# Patient Record
Sex: FEMALE | Race: WHITE | HISPANIC OR LATINO | URBAN - METROPOLITAN AREA
[De-identification: names, ages, dates, MRNs, and addresses within clinical notes are randomized per-mention and may not be internally consistent; named-entity substitution may affect disease eponyms.]

---

## 2017-09-23 ENCOUNTER — EMERGENCY (EMERGENCY)
Facility: HOSPITAL | Age: 57
LOS: 1 days | Discharge: HOME | End: 2017-09-23
Attending: EMERGENCY MEDICINE
Payer: COMMERCIAL

## 2017-09-23 VITALS
HEART RATE: 78 BPM | OXYGEN SATURATION: 97 % | RESPIRATION RATE: 16 BRPM | SYSTOLIC BLOOD PRESSURE: 102 MMHG | TEMPERATURE: 99 F | WEIGHT: 162.04 LBS | HEIGHT: 63 IN | DIASTOLIC BLOOD PRESSURE: 68 MMHG

## 2017-09-23 PROCEDURE — 99284 EMERGENCY DEPT VISIT MOD MDM: CPT

## 2017-09-23 PROCEDURE — 99283 EMERGENCY DEPT VISIT LOW MDM: CPT

## 2017-09-23 PROCEDURE — T1013: CPT

## 2017-09-23 RX ORDER — VALACYCLOVIR 500 MG/1
1 TABLET, FILM COATED ORAL
Qty: 21 | Refills: 0 | OUTPATIENT
Start: 2017-09-23 | End: 2017-09-30

## 2017-09-23 NOTE — ED STATDOCS - OBJECTIVE STATEMENT
56 y/o F presents to ED c/o painful rash to back x 2 days. Denies N/V/D, fever, chills, SOB, CP, difficulty breathing, HA, numbness, tingling and abd pain.

## 2017-10-08 ENCOUNTER — EMERGENCY (EMERGENCY)
Facility: HOSPITAL | Age: 57
LOS: 1 days | Discharge: HOME | End: 2017-10-08
Attending: EMERGENCY MEDICINE
Payer: COMMERCIAL

## 2017-10-08 VITALS
DIASTOLIC BLOOD PRESSURE: 73 MMHG | SYSTOLIC BLOOD PRESSURE: 132 MMHG | HEIGHT: 64 IN | OXYGEN SATURATION: 98 % | RESPIRATION RATE: 17 BRPM | HEART RATE: 102 BPM | TEMPERATURE: 102 F | WEIGHT: 162.92 LBS

## 2017-10-08 LAB
APPEARANCE UR: CLEAR — SIGNIFICANT CHANGE UP
BILIRUB UR-MCNC: NEGATIVE — SIGNIFICANT CHANGE UP
COLOR SPEC: YELLOW — SIGNIFICANT CHANGE UP
DIFF PNL FLD: ABNORMAL
GLUCOSE UR QL: NEGATIVE MG/DL — SIGNIFICANT CHANGE UP
KETONES UR-MCNC: NEGATIVE — SIGNIFICANT CHANGE UP
LEUKOCYTE ESTERASE UR-ACNC: ABNORMAL
NITRITE UR-MCNC: NEGATIVE — SIGNIFICANT CHANGE UP
PH UR: 7 — SIGNIFICANT CHANGE UP (ref 5–8)
PROT UR-MCNC: 15 MG/DL
SP GR SPEC: 1.01 — SIGNIFICANT CHANGE UP (ref 1.01–1.02)
UROBILINOGEN FLD QL: 4 MG/DL

## 2017-10-08 PROCEDURE — 81001 URINALYSIS AUTO W/SCOPE: CPT

## 2017-10-08 PROCEDURE — 99284 EMERGENCY DEPT VISIT MOD MDM: CPT

## 2017-10-08 PROCEDURE — 99284 EMERGENCY DEPT VISIT MOD MDM: CPT | Mod: 25

## 2017-10-08 PROCEDURE — 71020: CPT | Mod: 26

## 2017-10-08 PROCEDURE — 71046 X-RAY EXAM CHEST 2 VIEWS: CPT

## 2017-10-08 PROCEDURE — 96374 THER/PROPH/DIAG INJ IV PUSH: CPT

## 2017-10-08 RX ORDER — SODIUM CHLORIDE 9 MG/ML
2000 INJECTION INTRAMUSCULAR; INTRAVENOUS; SUBCUTANEOUS ONCE
Qty: 0 | Refills: 0 | Status: COMPLETED | OUTPATIENT
Start: 2017-10-08 | End: 2017-10-08

## 2017-10-08 RX ORDER — ACETAMINOPHEN 500 MG
650 TABLET ORAL ONCE
Qty: 0 | Refills: 0 | Status: COMPLETED | OUTPATIENT
Start: 2017-10-08 | End: 2017-10-08

## 2017-10-08 RX ORDER — KETOROLAC TROMETHAMINE 30 MG/ML
30 SYRINGE (ML) INJECTION ONCE
Qty: 0 | Refills: 0 | Status: DISCONTINUED | OUTPATIENT
Start: 2017-10-08 | End: 2017-10-08

## 2017-10-08 RX ADMIN — Medication 650 MILLIGRAM(S): at 22:27

## 2017-10-08 RX ADMIN — SODIUM CHLORIDE 2000 MILLILITER(S): 9 INJECTION INTRAMUSCULAR; INTRAVENOUS; SUBCUTANEOUS at 22:27

## 2017-10-08 RX ADMIN — Medication 30 MILLIGRAM(S): at 22:28

## 2017-10-08 NOTE — ED ADULT NURSE NOTE - OBJECTIVE STATEMENT
patient awake and alert with family at bedside patient states that for three days she has been having pain to her neck and trouble sleeping - states that she has been taking motrin with good results, but then again in the am the pain returns. patient with clear breath sounds - denies difficulty breathing - denies nausea or vomitting at this time.

## 2017-10-08 NOTE — ED ADULT NURSE NOTE - NS ED NURSE DC INFO COMPLEXITY
Simple: Patient demonstrates quick and easy understanding/Patient asked questions/Returned Demonstration

## 2017-10-08 NOTE — ED PROVIDER NOTE - OBJECTIVE STATEMENT
56 y/o F presents to ED c/o neck pain x 3 days. Associated Sx fever, dry cough, and weakness. Non-smoker and non-drinker. NKDA. Took 800mg ibuprofen to relief. Pt states she was in ED two weeks ago and was Dx with shingles, went to dermatologist and was told it was poison ivy. Denies N/V/D, chills, SOB, CP, difficulty breathing, HA, numbness, tingling, recent travel and abd pain.  PCP: Dr. Clint Cm

## 2017-10-08 NOTE — ED PROVIDER NOTE - MEDICAL DECISION MAKING DETAILS
56 y/o F presents to ED c/o neck pain and fever will get chest x-ray, UA, IV fluids, pruritics and re-evaluate. Likely viral syndrome.

## 2017-10-08 NOTE — ED ADULT TRIAGE NOTE - CHIEF COMPLAINT QUOTE
Pt axox3 c/o right sided neck pain and fever x1 day. Pt oral temp 102.0,  in ER. Pt denies chest pain or sob.

## 2017-10-09 ENCOUNTER — TRANSCRIPTION ENCOUNTER (OUTPATIENT)
Age: 57
End: 2017-10-09

## 2017-10-09 VITALS
DIASTOLIC BLOOD PRESSURE: 56 MMHG | OXYGEN SATURATION: 96 % | TEMPERATURE: 99 F | SYSTOLIC BLOOD PRESSURE: 103 MMHG | RESPIRATION RATE: 16 BRPM | HEART RATE: 85 BPM

## 2017-10-09 NOTE — ED ADULT NURSE REASSESSMENT NOTE - NS ED NURSE REASSESS COMMENT FT1
patient states that she is feeling better, no signs of pain at this time. no nausea or vomitting noted.

## 2017-10-18 ENCOUNTER — INPATIENT (INPATIENT)
Facility: HOSPITAL | Age: 57
LOS: 4 days | Discharge: HOME | DRG: 872 | End: 2017-10-23
Attending: HOSPITALIST | Admitting: HOSPITALIST
Payer: COMMERCIAL

## 2017-10-18 VITALS
WEIGHT: 164.91 LBS | SYSTOLIC BLOOD PRESSURE: 93 MMHG | OXYGEN SATURATION: 95 % | RESPIRATION RATE: 20 BRPM | DIASTOLIC BLOOD PRESSURE: 58 MMHG | HEIGHT: 61 IN | HEART RATE: 127 BPM | TEMPERATURE: 101 F

## 2017-10-18 DIAGNOSIS — A41.9 SEPSIS, UNSPECIFIED ORGANISM: ICD-10-CM

## 2017-10-18 DIAGNOSIS — O34.219 MATERNAL CARE FOR UNSPECIFIED TYPE SCAR FROM PREVIOUS CESAREAN DELIVERY: Chronic | ICD-10-CM

## 2017-10-18 LAB
ALBUMIN SERPL ELPH-MCNC: 3.5 G/DL — SIGNIFICANT CHANGE UP (ref 3.3–5.2)
ALP SERPL-CCNC: 177 U/L — HIGH (ref 40–120)
ALT FLD-CCNC: 48 U/L — HIGH
ANION GAP SERPL CALC-SCNC: 12 MMOL/L — SIGNIFICANT CHANGE UP (ref 5–17)
APPEARANCE UR: CLEAR — SIGNIFICANT CHANGE UP
APTT BLD: 28.2 SEC — SIGNIFICANT CHANGE UP (ref 27.5–37.4)
AST SERPL-CCNC: 43 U/L — HIGH
BACTERIA # UR AUTO: ABNORMAL
BILIRUB SERPL-MCNC: 0.4 MG/DL — SIGNIFICANT CHANGE UP (ref 0.4–2)
BILIRUB UR-MCNC: NEGATIVE — SIGNIFICANT CHANGE UP
BUN SERPL-MCNC: 14 MG/DL — SIGNIFICANT CHANGE UP (ref 8–20)
CALCIUM SERPL-MCNC: 8.4 MG/DL — LOW (ref 8.6–10.2)
CHLORIDE SERPL-SCNC: 95 MMOL/L — LOW (ref 98–107)
CO2 SERPL-SCNC: 24 MMOL/L — SIGNIFICANT CHANGE UP (ref 22–29)
COLOR SPEC: YELLOW — SIGNIFICANT CHANGE UP
CREAT SERPL-MCNC: 0.89 MG/DL — SIGNIFICANT CHANGE UP (ref 0.5–1.3)
DIFF PNL FLD: ABNORMAL
EPI CELLS # UR: SIGNIFICANT CHANGE UP
GLUCOSE SERPL-MCNC: 155 MG/DL — HIGH (ref 70–115)
GLUCOSE UR QL: NEGATIVE MG/DL — SIGNIFICANT CHANGE UP
HCT VFR BLD CALC: 40.9 % — SIGNIFICANT CHANGE UP (ref 37–47)
HGB BLD-MCNC: 13 G/DL — SIGNIFICANT CHANGE UP (ref 12–16)
INR BLD: 1.15 RATIO — SIGNIFICANT CHANGE UP (ref 0.88–1.16)
KETONES UR-MCNC: ABNORMAL
LACTATE BLDV-MCNC: 2 MMOL/L — SIGNIFICANT CHANGE UP (ref 0.5–2)
LEUKOCYTE ESTERASE UR-ACNC: ABNORMAL
LYMPHOCYTES # BLD AUTO: 45 % — SIGNIFICANT CHANGE UP (ref 20–55)
MACROCYTES BLD QL: SLIGHT — SIGNIFICANT CHANGE UP
MANUAL DIF COMMENT BLD-IMP: SIGNIFICANT CHANGE UP
MCHC RBC-ENTMCNC: 29.2 PG — SIGNIFICANT CHANGE UP (ref 27–31)
MCHC RBC-ENTMCNC: 31.8 G/DL — LOW (ref 32–36)
MCV RBC AUTO: 91.9 FL — SIGNIFICANT CHANGE UP (ref 81–99)
MICROCYTES BLD QL: SLIGHT — SIGNIFICANT CHANGE UP
MONOCYTES NFR BLD AUTO: 9 % — SIGNIFICANT CHANGE UP (ref 3–10)
NEUTROPHILS NFR BLD AUTO: 42 % — SIGNIFICANT CHANGE UP (ref 37–73)
NEUTS BAND # BLD: 1 % — SIGNIFICANT CHANGE UP (ref 0–8)
NITRITE UR-MCNC: NEGATIVE — SIGNIFICANT CHANGE UP
PH UR: 6 — SIGNIFICANT CHANGE UP (ref 5–8)
PLAT MORPH BLD: NORMAL — SIGNIFICANT CHANGE UP
PLATELET # BLD AUTO: 252 K/UL — SIGNIFICANT CHANGE UP (ref 150–400)
POLYCHROMASIA BLD QL SMEAR: SLIGHT — SIGNIFICANT CHANGE UP
POTASSIUM SERPL-MCNC: 4.2 MMOL/L — SIGNIFICANT CHANGE UP (ref 3.5–5.3)
POTASSIUM SERPL-SCNC: 4.2 MMOL/L — SIGNIFICANT CHANGE UP (ref 3.5–5.3)
PROT SERPL-MCNC: 7.4 G/DL — SIGNIFICANT CHANGE UP (ref 6.6–8.7)
PROT UR-MCNC: 30 MG/DL
PROTHROM AB SERPL-ACNC: 12.7 SEC — SIGNIFICANT CHANGE UP (ref 9.8–12.7)
RBC # BLD: 4.45 M/UL — SIGNIFICANT CHANGE UP (ref 4.4–5.2)
RBC # FLD: 14.6 % — SIGNIFICANT CHANGE UP (ref 11–15.6)
RBC BLD AUTO: ABNORMAL
RBC CASTS # UR COMP ASSIST: SIGNIFICANT CHANGE UP /HPF (ref 0–4)
SODIUM SERPL-SCNC: 131 MMOL/L — LOW (ref 135–145)
SP GR SPEC: 1.01 — SIGNIFICANT CHANGE UP (ref 1.01–1.02)
UROBILINOGEN FLD QL: 1 MG/DL
VARIANT LYMPHS # BLD: 3 % — SIGNIFICANT CHANGE UP (ref 0–6)
WBC # BLD: 8.4 K/UL — SIGNIFICANT CHANGE UP (ref 4.8–10.8)
WBC # FLD AUTO: 8.4 K/UL — SIGNIFICANT CHANGE UP (ref 4.8–10.8)
WBC UR QL: SIGNIFICANT CHANGE UP

## 2017-10-18 PROCEDURE — 71250 CT THORAX DX C-: CPT | Mod: 26

## 2017-10-18 PROCEDURE — 70486 CT MAXILLOFACIAL W/O DYE: CPT | Mod: 26

## 2017-10-18 PROCEDURE — 74176 CT ABD & PELVIS W/O CONTRAST: CPT | Mod: 26

## 2017-10-18 PROCEDURE — 99285 EMERGENCY DEPT VISIT HI MDM: CPT

## 2017-10-18 RX ORDER — VANCOMYCIN HCL 1 G
1000 VIAL (EA) INTRAVENOUS ONCE
Qty: 0 | Refills: 0 | Status: COMPLETED | OUTPATIENT
Start: 2017-10-18 | End: 2017-10-18

## 2017-10-18 RX ORDER — SODIUM CHLORIDE 9 MG/ML
3 INJECTION INTRAMUSCULAR; INTRAVENOUS; SUBCUTANEOUS ONCE
Qty: 0 | Refills: 0 | Status: COMPLETED | OUTPATIENT
Start: 2017-10-18 | End: 2017-10-18

## 2017-10-18 RX ORDER — ACETAMINOPHEN 500 MG
650 TABLET ORAL ONCE
Qty: 0 | Refills: 0 | Status: COMPLETED | OUTPATIENT
Start: 2017-10-18 | End: 2017-10-18

## 2017-10-18 RX ORDER — ERTAPENEM SODIUM 1 G/1
1000 INJECTION, POWDER, LYOPHILIZED, FOR SOLUTION INTRAMUSCULAR; INTRAVENOUS ONCE
Qty: 0 | Refills: 0 | Status: COMPLETED | OUTPATIENT
Start: 2017-10-18 | End: 2017-10-18

## 2017-10-18 RX ORDER — SODIUM CHLORIDE 9 MG/ML
500 INJECTION INTRAMUSCULAR; INTRAVENOUS; SUBCUTANEOUS
Qty: 0 | Refills: 0 | Status: COMPLETED | OUTPATIENT
Start: 2017-10-18 | End: 2017-10-18

## 2017-10-18 RX ORDER — KETOROLAC TROMETHAMINE 30 MG/ML
30 SYRINGE (ML) INJECTION ONCE
Qty: 0 | Refills: 0 | Status: DISCONTINUED | OUTPATIENT
Start: 2017-10-18 | End: 2017-10-18

## 2017-10-18 RX ADMIN — SODIUM CHLORIDE 2000 MILLILITER(S): 9 INJECTION INTRAMUSCULAR; INTRAVENOUS; SUBCUTANEOUS at 20:44

## 2017-10-18 RX ADMIN — Medication 30 MILLIGRAM(S): at 20:15

## 2017-10-18 RX ADMIN — Medication 250 MILLIGRAM(S): at 20:44

## 2017-10-18 RX ADMIN — SODIUM CHLORIDE 2000 MILLILITER(S): 9 INJECTION INTRAMUSCULAR; INTRAVENOUS; SUBCUTANEOUS at 20:00

## 2017-10-18 RX ADMIN — SODIUM CHLORIDE 3 MILLILITER(S): 9 INJECTION INTRAMUSCULAR; INTRAVENOUS; SUBCUTANEOUS at 19:59

## 2017-10-18 RX ADMIN — Medication 30 MILLIGRAM(S): at 20:30

## 2017-10-18 RX ADMIN — ERTAPENEM SODIUM 120 MILLIGRAM(S): 1 INJECTION, POWDER, LYOPHILIZED, FOR SOLUTION INTRAMUSCULAR; INTRAVENOUS at 20:10

## 2017-10-18 RX ADMIN — SODIUM CHLORIDE 2000 MILLILITER(S): 9 INJECTION INTRAMUSCULAR; INTRAVENOUS; SUBCUTANEOUS at 19:50

## 2017-10-18 RX ADMIN — SODIUM CHLORIDE 2000 MILLILITER(S): 9 INJECTION INTRAMUSCULAR; INTRAVENOUS; SUBCUTANEOUS at 20:08

## 2017-10-18 RX ADMIN — SODIUM CHLORIDE 2000 MILLILITER(S): 9 INJECTION INTRAMUSCULAR; INTRAVENOUS; SUBCUTANEOUS at 20:05

## 2017-10-18 RX ADMIN — Medication 650 MILLIGRAM(S): at 20:15

## 2017-10-18 NOTE — ED PROVIDER NOTE - MEDICAL DECISION MAKING DETAILS
Pt with signs of sepsis will give fluids, Tylenol, Toradol, empiric anabiotics, and await workup results.

## 2017-10-18 NOTE — ED PROVIDER NOTE - PHYSICAL EXAMINATION
Constitutional: Alert, NAD.   ENT: Airway patent. Nose clear. Mouth with normal mucosa.   Head: Atraumatic.   Eyes: Clear bilaterally. PERRL.   Cardiac: Tachycardic  Respiratory: Breath sounds clear bilaterally.   GI: Abdomen soft, non-tender, no guarding.   : No CVA or bladder tenderness.   Musculoskeletal: FROM, no muscle or joint tenderness or swelling.   Neuro: alert and oriented, no focal deficits, no motor or sensory deficits.   Skin: Dry, intact, no rash.   Psych: normal mood and affect.

## 2017-10-18 NOTE — ED PROVIDER NOTE - NS ED ROS FT
+ fever  +rash  no chest pain  no SOB  no abd pain, no urinary frequency   no HA  All other ROS negative except as per HPI

## 2017-10-18 NOTE — ED ADULT NURSE NOTE - OBJECTIVE STATEMENT
CODE SEPSIS initated @ 1945 upon arrival to the ED. Code team 2 at bedside. Pt is A&Ox 3 and seen by Dr. Cm and been having fevers x 2-3 weeks now. Blood exams and chest x-ray on the outside are negative and pt was also here a week ago for r/o shingles and dermatologist told her it wasn't shingles it was posion ivy to right flank. No rash present @ this time. and pt  reports 2/10 abd pain. Denies SOB, chest pain, burning upon urination or painful urination. Lungs CTA, bowel sounds present in all 4 quadrants soft nontender abd. Pt moves all 4 extremities. Skin warm and dry and intact.

## 2017-10-18 NOTE — ED PROVIDER NOTE - OBJECTIVE STATEMENT
56 y/o female with PMHx pre-diabetes presents to the ED with c/o fever, onset 2 weeks. Per family pt was sent by Dr. Cm for evaluation. Pt also c/o rash to the flank, was diagnosed with shingles at [facility] told to followup with dermatology who diagnosed the pt with poison ivy. Denies urinary frequency, abdominal pain, and any other acute symptoms and complaints at this time.   CC: Fever  Presenting symptoms: fever  Pertinent Positives: +fever, +rash  Pertinent Negatives: no abd pain, no urinary frequency  Timin weeks  Quality: n/a  Radiation: none  Severity: moderate  Aggravating Factors: none  Relieving Factors: none

## 2017-10-19 DIAGNOSIS — R50.9 FEVER, UNSPECIFIED: ICD-10-CM

## 2017-10-19 LAB
ALBUMIN SERPL ELPH-MCNC: 3 G/DL — LOW (ref 3.3–5.2)
ALP SERPL-CCNC: 139 U/L — HIGH (ref 40–120)
ALT FLD-CCNC: 38 U/L — HIGH
ANION GAP SERPL CALC-SCNC: 11 MMOL/L — SIGNIFICANT CHANGE UP (ref 5–17)
ANISOCYTOSIS BLD QL: SLIGHT — SIGNIFICANT CHANGE UP
AST SERPL-CCNC: 39 U/L — HIGH
BILIRUB SERPL-MCNC: 0.3 MG/DL — LOW (ref 0.4–2)
BUN SERPL-MCNC: 11 MG/DL — SIGNIFICANT CHANGE UP (ref 8–20)
CALCIUM SERPL-MCNC: 7.6 MG/DL — LOW (ref 8.6–10.2)
CHLORIDE SERPL-SCNC: 105 MMOL/L — SIGNIFICANT CHANGE UP (ref 98–107)
CO2 SERPL-SCNC: 23 MMOL/L — SIGNIFICANT CHANGE UP (ref 22–29)
CREAT SERPL-MCNC: 0.68 MG/DL — SIGNIFICANT CHANGE UP (ref 0.5–1.3)
CRP SERPL-MCNC: 5.2 MG/DL — HIGH (ref 0–0.4)
CULTURE RESULTS: NO GROWTH — SIGNIFICANT CHANGE UP
EOSINOPHIL NFR BLD AUTO: 2 % — SIGNIFICANT CHANGE UP (ref 0–5)
ERYTHROCYTE [SEDIMENTATION RATE] IN BLOOD: 41 MM/HR — HIGH (ref 0–20)
GLUCOSE BLDC GLUCOMTR-MCNC: 123 MG/DL — HIGH (ref 70–99)
GLUCOSE BLDC GLUCOMTR-MCNC: 128 MG/DL — HIGH (ref 70–99)
GLUCOSE BLDC GLUCOMTR-MCNC: 143 MG/DL — HIGH (ref 70–99)
GLUCOSE BLDC GLUCOMTR-MCNC: 89 MG/DL — SIGNIFICANT CHANGE UP (ref 70–99)
GLUCOSE BLDC GLUCOMTR-MCNC: 96 MG/DL — SIGNIFICANT CHANGE UP (ref 70–99)
GLUCOSE SERPL-MCNC: 103 MG/DL — SIGNIFICANT CHANGE UP (ref 70–115)
HAV IGM SER-ACNC: SIGNIFICANT CHANGE UP
HBV CORE IGM SER-ACNC: SIGNIFICANT CHANGE UP
HBV SURFACE AG SER-ACNC: SIGNIFICANT CHANGE UP
HCT VFR BLD CALC: 33.8 % — LOW (ref 37–47)
HCT VFR BLD CALC: 37.3 % — SIGNIFICANT CHANGE UP (ref 37–47)
HCV AB S/CO SERPL IA: 0.27 S/CO — SIGNIFICANT CHANGE UP
HCV AB SERPL-IMP: SIGNIFICANT CHANGE UP
HGB BLD-MCNC: 11.1 G/DL — LOW (ref 12–16)
HGB BLD-MCNC: 12.2 G/DL — SIGNIFICANT CHANGE UP (ref 12–16)
HIV 1 & 2 AB SERPL IA.RAPID: SIGNIFICANT CHANGE UP
HYPOCHROMIA BLD QL: SLIGHT — SIGNIFICANT CHANGE UP
LACTATE BLDV-MCNC: 0.8 MMOL/L — SIGNIFICANT CHANGE UP (ref 0.5–2)
LACTATE BLDV-MCNC: 1.3 MMOL/L — SIGNIFICANT CHANGE UP (ref 0.5–2)
LACTATE BLDV-MCNC: 2.2 MMOL/L — HIGH (ref 0.5–2)
LACTATE SERPL-SCNC: 2.1 MMOL/L — HIGH (ref 0.5–2)
LYMPHOCYTES # BLD AUTO: 21 % — SIGNIFICANT CHANGE UP (ref 20–55)
MACROCYTES BLD QL: SLIGHT — SIGNIFICANT CHANGE UP
MCHC RBC-ENTMCNC: 29.8 PG — SIGNIFICANT CHANGE UP (ref 27–31)
MCHC RBC-ENTMCNC: 29.8 PG — SIGNIFICANT CHANGE UP (ref 27–31)
MCHC RBC-ENTMCNC: 32.7 G/DL — SIGNIFICANT CHANGE UP (ref 32–36)
MCHC RBC-ENTMCNC: 32.8 G/DL — SIGNIFICANT CHANGE UP (ref 32–36)
MCV RBC AUTO: 90.6 FL — SIGNIFICANT CHANGE UP (ref 81–99)
MCV RBC AUTO: 91.2 FL — SIGNIFICANT CHANGE UP (ref 81–99)
MICROCYTES BLD QL: SLIGHT — SIGNIFICANT CHANGE UP
MONOCYTES NFR BLD AUTO: 4 % — SIGNIFICANT CHANGE UP (ref 3–10)
NEUTROPHILS NFR BLD AUTO: 66 % — SIGNIFICANT CHANGE UP (ref 37–73)
NEUTS BAND # BLD: 2 % — SIGNIFICANT CHANGE UP (ref 0–8)
OVALOCYTES BLD QL SMEAR: SLIGHT — SIGNIFICANT CHANGE UP
PLAT MORPH BLD: NORMAL — SIGNIFICANT CHANGE UP
PLATELET # BLD AUTO: 219 K/UL — SIGNIFICANT CHANGE UP (ref 150–400)
PLATELET # BLD AUTO: 226 K/UL — SIGNIFICANT CHANGE UP (ref 150–400)
POIKILOCYTOSIS BLD QL AUTO: SLIGHT — SIGNIFICANT CHANGE UP
POTASSIUM SERPL-MCNC: 4.1 MMOL/L — SIGNIFICANT CHANGE UP (ref 3.5–5.3)
POTASSIUM SERPL-SCNC: 4.1 MMOL/L — SIGNIFICANT CHANGE UP (ref 3.5–5.3)
PROCALCITONIN SERPL-MCNC: 0.3 NG/ML — HIGH (ref 0–0.04)
PROCALCITONIN SERPL-MCNC: 0.4 NG/ML — HIGH (ref 0–0.04)
PROT SERPL-MCNC: 6.3 G/DL — LOW (ref 6.6–8.7)
RAPID RVP RESULT: SIGNIFICANT CHANGE UP
RBC # BLD: 3.73 M/UL — LOW (ref 4.4–5.2)
RBC # BLD: 4.09 M/UL — LOW (ref 4.4–5.2)
RBC # FLD: 14.3 % — SIGNIFICANT CHANGE UP (ref 11–15.6)
RBC # FLD: 14.4 % — SIGNIFICANT CHANGE UP (ref 11–15.6)
RBC BLD AUTO: ABNORMAL
SODIUM SERPL-SCNC: 139 MMOL/L — SIGNIFICANT CHANGE UP (ref 135–145)
SPECIMEN SOURCE: SIGNIFICANT CHANGE UP
VARIANT LYMPHS # BLD: 5 % — SIGNIFICANT CHANGE UP (ref 0–6)
WBC # BLD: 6.1 K/UL — SIGNIFICANT CHANGE UP (ref 4.8–10.8)
WBC # BLD: 6.5 K/UL — SIGNIFICANT CHANGE UP (ref 4.8–10.8)
WBC # FLD AUTO: 6.1 K/UL — SIGNIFICANT CHANGE UP (ref 4.8–10.8)
WBC # FLD AUTO: 6.5 K/UL — SIGNIFICANT CHANGE UP (ref 4.8–10.8)

## 2017-10-19 PROCEDURE — 76856 US EXAM PELVIC COMPLETE: CPT | Mod: 26

## 2017-10-19 PROCEDURE — 99233 SBSQ HOSP IP/OBS HIGH 50: CPT

## 2017-10-19 PROCEDURE — 99223 1ST HOSP IP/OBS HIGH 75: CPT

## 2017-10-19 PROCEDURE — 93010 ELECTROCARDIOGRAM REPORT: CPT

## 2017-10-19 PROCEDURE — 99222 1ST HOSP IP/OBS MODERATE 55: CPT

## 2017-10-19 RX ORDER — SODIUM CHLORIDE 9 MG/ML
1000 INJECTION, SOLUTION INTRAVENOUS ONCE
Qty: 0 | Refills: 0 | Status: COMPLETED | OUTPATIENT
Start: 2017-10-19 | End: 2017-10-19

## 2017-10-19 RX ORDER — ERTAPENEM SODIUM 1 G/1
1000 INJECTION, POWDER, LYOPHILIZED, FOR SOLUTION INTRAMUSCULAR; INTRAVENOUS ONCE
Qty: 0 | Refills: 0 | Status: COMPLETED | OUTPATIENT
Start: 2017-10-19 | End: 2017-10-19

## 2017-10-19 RX ORDER — SODIUM CHLORIDE 9 MG/ML
1000 INJECTION, SOLUTION INTRAVENOUS ONCE
Qty: 0 | Refills: 0 | Status: COMPLETED | OUTPATIENT
Start: 2017-10-19 | End: 2017-10-20

## 2017-10-19 RX ORDER — DEXTROSE 50 % IN WATER 50 %
12.5 SYRINGE (ML) INTRAVENOUS ONCE
Qty: 0 | Refills: 0 | Status: DISCONTINUED | OUTPATIENT
Start: 2017-10-19 | End: 2017-10-21

## 2017-10-19 RX ORDER — VANCOMYCIN HCL 1 G
1250 VIAL (EA) INTRAVENOUS
Qty: 0 | Refills: 0 | Status: DISCONTINUED | OUTPATIENT
Start: 2017-10-19 | End: 2017-10-19

## 2017-10-19 RX ORDER — SODIUM CHLORIDE 9 MG/ML
1000 INJECTION INTRAMUSCULAR; INTRAVENOUS; SUBCUTANEOUS ONCE
Qty: 0 | Refills: 0 | Status: COMPLETED | OUTPATIENT
Start: 2017-10-19 | End: 2017-10-19

## 2017-10-19 RX ORDER — VANCOMYCIN HCL 1 G
1250 VIAL (EA) INTRAVENOUS ONCE
Qty: 0 | Refills: 0 | Status: COMPLETED | OUTPATIENT
Start: 2017-10-19 | End: 2017-10-19

## 2017-10-19 RX ORDER — SODIUM CHLORIDE 9 MG/ML
1000 INJECTION, SOLUTION INTRAVENOUS
Qty: 0 | Refills: 0 | Status: DISCONTINUED | OUTPATIENT
Start: 2017-10-19 | End: 2017-10-21

## 2017-10-19 RX ORDER — DEXTROSE 50 % IN WATER 50 %
25 SYRINGE (ML) INTRAVENOUS ONCE
Qty: 0 | Refills: 0 | Status: DISCONTINUED | OUTPATIENT
Start: 2017-10-19 | End: 2017-10-21

## 2017-10-19 RX ORDER — SODIUM CHLORIDE 9 MG/ML
1000 INJECTION INTRAMUSCULAR; INTRAVENOUS; SUBCUTANEOUS
Qty: 0 | Refills: 0 | Status: DISCONTINUED | OUTPATIENT
Start: 2017-10-19 | End: 2017-10-23

## 2017-10-19 RX ORDER — VANCOMYCIN HCL 1 G
1250 VIAL (EA) INTRAVENOUS EVERY 12 HOURS
Qty: 0 | Refills: 0 | Status: DISCONTINUED | OUTPATIENT
Start: 2017-10-20 | End: 2017-10-21

## 2017-10-19 RX ORDER — DEXTROSE 50 % IN WATER 50 %
1 SYRINGE (ML) INTRAVENOUS ONCE
Qty: 0 | Refills: 0 | Status: DISCONTINUED | OUTPATIENT
Start: 2017-10-19 | End: 2017-10-21

## 2017-10-19 RX ORDER — VANCOMYCIN HCL 1 G
1000 VIAL (EA) INTRAVENOUS
Qty: 0 | Refills: 0 | Status: DISCONTINUED | OUTPATIENT
Start: 2017-10-19 | End: 2017-10-19

## 2017-10-19 RX ORDER — VANCOMYCIN HCL 1 G
1500 VIAL (EA) INTRAVENOUS
Qty: 0 | Refills: 0 | Status: DISCONTINUED | OUTPATIENT
Start: 2017-10-19 | End: 2017-10-19

## 2017-10-19 RX ORDER — SODIUM CHLORIDE 9 MG/ML
500 INJECTION, SOLUTION INTRAVENOUS ONCE
Qty: 0 | Refills: 0 | Status: COMPLETED | OUTPATIENT
Start: 2017-10-19 | End: 2017-10-19

## 2017-10-19 RX ORDER — INFLUENZA VIRUS VACCINE 15; 15; 15; 15 UG/.5ML; UG/.5ML; UG/.5ML; UG/.5ML
0.5 SUSPENSION INTRAMUSCULAR ONCE
Qty: 0 | Refills: 0 | Status: COMPLETED | OUTPATIENT
Start: 2017-10-19 | End: 2017-10-19

## 2017-10-19 RX ORDER — INSULIN LISPRO 100/ML
VIAL (ML) SUBCUTANEOUS
Qty: 0 | Refills: 0 | Status: DISCONTINUED | OUTPATIENT
Start: 2017-10-19 | End: 2017-10-21

## 2017-10-19 RX ORDER — ACETAMINOPHEN 500 MG
650 TABLET ORAL EVERY 6 HOURS
Qty: 0 | Refills: 0 | Status: DISCONTINUED | OUTPATIENT
Start: 2017-10-19 | End: 2017-10-23

## 2017-10-19 RX ORDER — ERTAPENEM SODIUM 1 G/1
1000 INJECTION, POWDER, LYOPHILIZED, FOR SOLUTION INTRAMUSCULAR; INTRAVENOUS EVERY 24 HOURS
Qty: 0 | Refills: 0 | Status: DISCONTINUED | OUTPATIENT
Start: 2017-10-19 | End: 2017-10-19

## 2017-10-19 RX ORDER — VANCOMYCIN HCL 1 G
1000 VIAL (EA) INTRAVENOUS EVERY 12 HOURS
Qty: 0 | Refills: 0 | Status: DISCONTINUED | OUTPATIENT
Start: 2017-10-19 | End: 2017-10-19

## 2017-10-19 RX ORDER — ERTAPENEM SODIUM 1 G/1
1000 INJECTION, POWDER, LYOPHILIZED, FOR SOLUTION INTRAMUSCULAR; INTRAVENOUS EVERY 24 HOURS
Qty: 0 | Refills: 0 | Status: DISCONTINUED | OUTPATIENT
Start: 2017-10-20 | End: 2017-10-21

## 2017-10-19 RX ORDER — VANCOMYCIN HCL 1 G
VIAL (EA) INTRAVENOUS
Qty: 0 | Refills: 0 | Status: DISCONTINUED | OUTPATIENT
Start: 2017-10-19 | End: 2017-10-21

## 2017-10-19 RX ORDER — ERTAPENEM SODIUM 1 G/1
INJECTION, POWDER, LYOPHILIZED, FOR SOLUTION INTRAMUSCULAR; INTRAVENOUS
Qty: 0 | Refills: 0 | Status: DISCONTINUED | OUTPATIENT
Start: 2017-10-19 | End: 2017-10-21

## 2017-10-19 RX ORDER — GLUCAGON INJECTION, SOLUTION 0.5 MG/.1ML
1 INJECTION, SOLUTION SUBCUTANEOUS ONCE
Qty: 0 | Refills: 0 | Status: DISCONTINUED | OUTPATIENT
Start: 2017-10-19 | End: 2017-10-21

## 2017-10-19 RX ADMIN — SODIUM CHLORIDE 1000 MILLILITER(S): 9 INJECTION INTRAMUSCULAR; INTRAVENOUS; SUBCUTANEOUS at 00:37

## 2017-10-19 RX ADMIN — SODIUM CHLORIDE 100 MILLILITER(S): 9 INJECTION INTRAMUSCULAR; INTRAVENOUS; SUBCUTANEOUS at 05:43

## 2017-10-19 RX ADMIN — Medication 650 MILLIGRAM(S): at 10:40

## 2017-10-19 RX ADMIN — ERTAPENEM SODIUM 120 MILLIGRAM(S): 1 INJECTION, POWDER, LYOPHILIZED, FOR SOLUTION INTRAMUSCULAR; INTRAVENOUS at 20:36

## 2017-10-19 RX ADMIN — Medication 650 MILLIGRAM(S): at 20:08

## 2017-10-19 RX ADMIN — Medication 166.67 MILLIGRAM(S): at 20:36

## 2017-10-19 RX ADMIN — Medication 166.67 MILLIGRAM(S): at 10:40

## 2017-10-19 RX ADMIN — SODIUM CHLORIDE 100 MILLILITER(S): 9 INJECTION INTRAMUSCULAR; INTRAVENOUS; SUBCUTANEOUS at 10:41

## 2017-10-19 RX ADMIN — SODIUM CHLORIDE 2000 MILLILITER(S): 9 INJECTION, SOLUTION INTRAVENOUS at 20:21

## 2017-10-19 RX ADMIN — SODIUM CHLORIDE 2000 MILLILITER(S): 9 INJECTION, SOLUTION INTRAVENOUS at 23:22

## 2017-10-19 NOTE — H&P ADULT - HISTORY OF PRESENT ILLNESS
Pt is a 56 yo female with pmh of pre diabetes presenting with fevers. Pt states that 2-3 weeks ago she had a D and C. Then 2 weeks ago she noticed having fevers on a daily basis. Fever is as high as 102. Pt has been taking advil twicw a day. No abd pain, no cough, no vomiting, no dysuria, no vaginal discharge, no pelvic pain, no lower extremity pain/swelling.   Pt recently presented to ED with rash thought to be shingles. Pt followed up with dermatology and diagnosed instead with poison ivy rash which has now resolved.     In ED, fever 101.4.Cultures sent.

## 2017-10-19 NOTE — H&P ADULT - NSHPLABSRESULTS_GEN_ALL_CORE
LABS:  Urinalysis Basic - ( 18 Oct 2017 21:39 )  Appearance: Clear / S.015 / pH: x  Gluc: x / Ketone: Trace  / Bili: Negative / Urobili: 1 mg/dL   Blood: x / Protein: 30 mg/dL / Nitrite: Negative   Leuk Esterase: Trace / RBC: 0-2 /HPF / WBC 0-2   Sq Epi: x / Non Sq Epi: Occasional / Bacteria: Occasional    PT/INR - ( 18 Oct 2017 20:05 )   PT: 12.7 sec;   INR: 1.15 ratio    PTT - ( 18 Oct 2017 20:05 )  PTT:28.2 sec  LIVER FUNCTIONS - ( 18 Oct 2017 20:05 )  Alb: 3.5 g/dL / Pro: 7.4 g/dL / ALK PHOS: 177 U/L / ALT: 48 U/L / AST: 43 U/L / GGT: x                               13.0   8.4   )-----------( 252      ( 18 Oct 2017 20:05 )             40.9   10-  131<L>  |  95<L>  |  14.0  ----------------------------<  155<H>  4.2   |  24.0  |  0.89  Ca    8.4<L>      18 Oct 2017 20:05    TPro  7.4  /  Alb  3.5  /  TBili  0.4  /  DBili  x   /  AST  43<H>  /  ALT  48<H>  /  AlkPhos  177<H>  10-18    CT CHEST:  < from: CT Chest No Cont (10.18.17 @ 21:40) >IMPRESSION: CT Chest: No pulmonary consolidation. Linear subsegmental lingular   atelectasis.    CT Abdomen and Pelvis: No discernible acute abnormality.    CT SINUS: < from: CT Sinuses No Cont (10.18.17 @ 21:40) >  IMPRESSION:   Aside from trace ethmoid sinus mucosal thickening, clear paranasal   sinuses and mastoid air cells.  < end of copied text >

## 2017-10-19 NOTE — PROGRESS NOTE ADULT - SUBJECTIVE AND OBJECTIVE BOX
BLANK AKHTAR  ----------------------------------------  The patient was seen and evaluated for fever.  The patient is in no acute distress.  Denied any chest pain, palpitations, dyspnea, or abdominal pain.  Reports malaise.    Vital Signs Last 24 Hrs  T(C): 37.3 (19 Oct 2017 12:04), Max: 38.7 (19 Oct 2017 09:31)  T(F): 99.2 (19 Oct 2017 12:04), Max: 101.7 (19 Oct 2017 09:31)  HR: 105 (19 Oct 2017 12:04) (78 - 127)  BP: 117/56 (19 Oct 2017 12:04) (88/50 - 117/56)  BP(mean): --  RR: 20 (19 Oct 2017 12:04) (18 - 20)  SpO2: 95% (19 Oct 2017 12:04) (94% - 99%)    POCT Blood Glucose.: 128 mg/dL (19 Oct 2017 10:03)  POCT Blood Glucose.: 89 mg/dL (19 Oct 2017 08:29)    PHYSICAL EXAMINATION:  ----------------------------------------  General appearance: NAD, Awake, Alert  HEENT: NCAT, Conjunctiva clear, EOMI  Neck: Supple, No JVD, No tenderness  Lungs: Clear to auscultation, Breath sound equal bilaterally, No wheezes, No rales  Cardiovascular: S1S2, Regular rhythm  Abdomen: Soft, Nontender, Nondistended, No guarding/rebound, Positive bowel sounds  Extremities: No clubbing, No cyanosis, No edema, No calf tenderness  Neuro: Strength equal bilaterally, No tremors  Psychiatric: Appropriate mood, Normal affect    LABORATORY STUDIES:  ----------------------------------------             12.2   6.1   )-----------( 226      ( 19 Oct 2017 08:23 )             37.3     10-19    139  |  105  |  11.0  ----------------------------<  103  4.1   |  23.0  |  0.68    Ca    7.6<L>      19 Oct 2017 06:13    TPro  6.3<L>  /  Alb  3.0<L>  /  TBili  0.3<L>  /  DBili  x   /  AST  39<H>  /  ALT  38<H>  /  AlkPhos  139<H>  10-19    LIVER FUNCTIONS - ( 19 Oct 2017 06:13 )  Alb: 3.0 g/dL / Pro: 6.3 g/dL / ALK PHOS: 139 U/L / ALT: 38 U/L / AST: 39 U/L / GGT: x           PT/INR - ( 18 Oct 2017 20:05 )   PT: 12.7 sec;   INR: 1.15 ratio    PTT - ( 18 Oct 2017 20:05 )  PTT:28.2 sec    Urinalysis Basic - ( 18 Oct 2017 21:39 )  Color: Yellow / Appearance: Clear / S.015 / pH: x  Gluc: x / Ketone: Trace  / Bili: Negative / Urobili: 1 mg/dL   Blood: x / Protein: 30 mg/dL / Nitrite: Negative   Leuk Esterase: Trace / RBC: 0-2 /HPF / WBC 0-2   Sq Epi: x / Non Sq Epi: Occasional / Bacteria: Occasional    MEDICATIONS  (STANDING):  dextrose 5%. 1000 milliLiter(s) (50 mL/Hr) IV Continuous <Continuous>  dextrose 50% Injectable 12.5 Gram(s) IV Push once  dextrose 50% Injectable 25 Gram(s) IV Push once  dextrose 50% Injectable 25 Gram(s) IV Push once  ertapenem  IVPB 1000 milliGRAM(s) IV Intermittent every 24 hours  insulin lispro (HumaLOG) corrective regimen sliding scale   SubCutaneous three times a day before meals  sodium chloride 0.9%. 1000 milliLiter(s) (100 mL/Hr) IV Continuous <Continuous>  vancomycin  IVPB 1250 milliGRAM(s) IV Intermittent <User Schedule>    MEDICATIONS  (PRN):  acetaminophen   Tablet 650 milliGRAM(s) Oral every 6 hours PRN For Temp greater than 38 C (100.4 F)  dextrose Gel 1 Dose(s) Oral once PRN Blood Glucose LESS THAN 70 milliGRAM(s)/deciliter  glucagon  Injectable 1 milliGRAM(s) IntraMuscular once PRN Glucose LESS THAN 70 milligrams/deciliter      ASSESSMENT / PLAN:  ----------------------------------------  Fever - No obvious source of infection noted on exam. Infectious Disease consultation pending. Culture results to be followed. On empiric antibiotics. Serologies to be followed. Pelvic ultrasound pending given history of prior D&C a few weeks ago.    Prediabetes - On insulin coverage as needed. Consistent carbohydrate diet.    Hyponatremia - Sodium level improved on repeat laboratory studies.    Transaminitis - AST/ALT improved. BLANK AKHTAR  ----------------------------------------  The patient was seen and evaluated for fever.  The patient is in no acute distress.  Denied any chest pain, palpitations, dyspnea, or abdominal pain.  Reports malaise.    Vital Signs Last 24 Hrs  T(C): 37.3 (19 Oct 2017 12:04), Max: 38.7 (19 Oct 2017 09:31)  T(F): 99.2 (19 Oct 2017 12:04), Max: 101.7 (19 Oct 2017 09:31)  HR: 105 (19 Oct 2017 12:04) (78 - 127)  BP: 117/56 (19 Oct 2017 12:04) (88/50 - 117/56)  BP(mean): --  RR: 20 (19 Oct 2017 12:04) (18 - 20)  SpO2: 95% (19 Oct 2017 12:04) (94% - 99%)    POCT Blood Glucose.: 128 mg/dL (19 Oct 2017 10:03)  POCT Blood Glucose.: 89 mg/dL (19 Oct 2017 08:29)    PHYSICAL EXAMINATION:  ----------------------------------------  General appearance: NAD, Awake, Alert  HEENT: NCAT, Conjunctiva clear, EOMI  Neck: Supple, No JVD, No tenderness  Lungs: Clear to auscultation, Breath sound equal bilaterally, No wheezes, No rales  Cardiovascular: S1S2, Regular rhythm  Abdomen: Soft, Nontender, Nondistended, No guarding/rebound, Positive bowel sounds  Extremities: No clubbing, No cyanosis, No edema, No calf tenderness  Neuro: Strength equal bilaterally, No tremors  Psychiatric: Appropriate mood, Normal affect    LABORATORY STUDIES:  ----------------------------------------             12.2   6.1   )-----------( 226      ( 19 Oct 2017 08:23 )             37.3     10-19    139  |  105  |  11.0  ----------------------------<  103  4.1   |  23.0  |  0.68    Ca    7.6<L>      19 Oct 2017 06:13    TPro  6.3<L>  /  Alb  3.0<L>  /  TBili  0.3<L>  /  DBili  x   /  AST  39<H>  /  ALT  38<H>  /  AlkPhos  139<H>  10-19    LIVER FUNCTIONS - ( 19 Oct 2017 06:13 )  Alb: 3.0 g/dL / Pro: 6.3 g/dL / ALK PHOS: 139 U/L / ALT: 38 U/L / AST: 39 U/L / GGT: x           PT/INR - ( 18 Oct 2017 20:05 )   PT: 12.7 sec;   INR: 1.15 ratio    PTT - ( 18 Oct 2017 20:05 )  PTT:28.2 sec    Urinalysis Basic - ( 18 Oct 2017 21:39 )  Color: Yellow / Appearance: Clear / S.015 / pH: x  Gluc: x / Ketone: Trace  / Bili: Negative / Urobili: 1 mg/dL   Blood: x / Protein: 30 mg/dL / Nitrite: Negative   Leuk Esterase: Trace / RBC: 0-2 /HPF / WBC 0-2   Sq Epi: x / Non Sq Epi: Occasional / Bacteria: Occasional    MEDICATIONS  (STANDING):  dextrose 5%. 1000 milliLiter(s) (50 mL/Hr) IV Continuous <Continuous>  dextrose 50% Injectable 12.5 Gram(s) IV Push once  dextrose 50% Injectable 25 Gram(s) IV Push once  dextrose 50% Injectable 25 Gram(s) IV Push once  ertapenem  IVPB 1000 milliGRAM(s) IV Intermittent every 24 hours  insulin lispro (HumaLOG) corrective regimen sliding scale   SubCutaneous three times a day before meals  sodium chloride 0.9%. 1000 milliLiter(s) (100 mL/Hr) IV Continuous <Continuous>  vancomycin  IVPB 1250 milliGRAM(s) IV Intermittent <User Schedule>    MEDICATIONS  (PRN):  acetaminophen   Tablet 650 milliGRAM(s) Oral every 6 hours PRN For Temp greater than 38 C (100.4 F)  dextrose Gel 1 Dose(s) Oral once PRN Blood Glucose LESS THAN 70 milliGRAM(s)/deciliter  glucagon  Injectable 1 milliGRAM(s) IntraMuscular once PRN Glucose LESS THAN 70 milligrams/deciliter      ASSESSMENT / PLAN:  ----------------------------------------  Fever - No obvious source of infection noted on exam. Infectious Disease consultation pending. Culture results to be followed. On empiric antibiotics. Serologies to be followed. Pelvic ultrasound pending given history of prior D&C a few weeks ago. CT of the sinuses, chest, abdomen, and pelvis were unremarkable.	    Prediabetes - On insulin coverage as needed. Consistent carbohydrate diet.    Hyponatremia - Sodium level improved on repeat laboratory studies.    Transaminitis - AST/ALT improved.

## 2017-10-19 NOTE — H&P ADULT - NSHPSOCIALHISTORY_GEN_ALL_CORE
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   no tobacco use  no etoh  no drug use

## 2017-10-19 NOTE — CONSULT NOTE ADULT - SUBJECTIVE AND OBJECTIVE BOX
NPP INFECTIOUS DISEASES AND INTERNAL MEDICINE OF [place]  =======================================================  Mitch Lemos MD [facility]   Jhonny Lemus MD  Diplomates American Board of Internal Medicine and Infectious Diseases  =======================================================    [facility]-[id number]  BLANK AKHTAR is a 57y  Female   This 58 yo female with prediabetes presenting with fevers. Pt states that 2-3 weeks ago she had a D and C.   Then 2 weeks ago she noticed having fevers on a daily basis. Fever is as high as 102. Pt has been taking advil twice a day. No abd pain, no cough, no vomiting, no dysuria, no vaginal discharge, no pelvic pain, no lower extremity pain/swelling.   Pt recently presented to ED with rash thought to be shingles. Pt followed up with dermatology and diagnosed instead with poison ivy rash which has now resolved.     In ED, fever 101.4.Cultures sent.     =======================================================  Past Medical & Surgical Hx:  =====================  PAST MEDICAL & SURGICAL HISTORY:  No pertinent past medical history  Delivery with history of       Problem List:  ==========  HEALTH ISSUES - PROBLEM Dx:          Social Hx:  =======  no toxic habits currently      FAMILY HISTORY:      Allergies  No Known Allergies  Intolerances       MEDICATIONS  (STANDING):  dextrose 5%. 1000 milliLiter(s) (50 mL/Hr) IV Continuous <Continuous>  dextrose 50% Injectable 12.5 Gram(s) IV Push once  dextrose 50% Injectable 25 Gram(s) IV Push once  dextrose 50% Injectable 25 Gram(s) IV Push once  ertapenem  IVPB 1000 milliGRAM(s) IV Intermittent every 24 hours  insulin lispro (HumaLOG) corrective regimen sliding scale   SubCutaneous three times a day before meals  sodium chloride 0.9%. 1000 milliLiter(s) (100 mL/Hr) IV Continuous <Continuous>  vancomycin  IVPB 1250 milliGRAM(s) IV Intermittent <User Schedule>    MEDICATIONS  (PRN):  acetaminophen   Tablet 650 milliGRAM(s) Oral every 6 hours PRN For Temp greater than 38 C (100.4 F)  dextrose Gel 1 Dose(s) Oral once PRN Blood Glucose LESS THAN 70 milliGRAM(s)/deciliter  glucagon  Injectable 1 milliGRAM(s) IntraMuscular once PRN Glucose LESS THAN 70 milligrams/deciliter        =======================================================  REVIEW OF SYSTEMS:  Constitutional: No fever, No chills, No sweats.  Eye: No icterus, No double vision.  Ear/Nose/Mouth/Throat: No nasal congestion, No sore throat.  Respiratory: No shortness of breath, No cough, No sputum production, No wheezing.  Cardiovascular: No chest pain, No palpitations, No syncope.  Gastrointestinal: No nausea, No vomiting, No diarrhea, No abdominal pain.  Genitourinary: No dysuria, No hematuria, No change in urine stream.  Hematology/Lymphatics: No bleeding tendency.  Endocrine: No excessive thirst, No polyuria.  Immunologic: No malaise.  Musculoskeletal: No back pain, No neck pain, No joint pain, No muscle pain.  Integumentary: No rash, No pruritus, No skin lesion.  Neurologic: No numbness, No tingling, No headache.  Psychiatric: No depression.    =======================================================    Physical Exam:  ============  Vital Signs Last 24 Hrs  T(C): 37.3 (19 Oct 2017 12:04), Max: 38.7 (19 Oct 2017 09:31)  T(F): 99.2 (19 Oct 2017 12:04), Max: 101.7 (19 Oct 2017 09:31)  HR: 105 (19 Oct 2017 12:04) (78 - 127)  BP: 117/56 (19 Oct 2017 12:04) (88/50 - 117/56)  RR: 20 (19 Oct 2017 12:04) (18 - 20)  SpO2: 95% (19 Oct 2017 12:04) (94% - 99%)  Height (cm): 154.94 (10-18 @ 19:25)  Weight (kg): 74.8 (10-18 @ 19:25)  BMI (kg/m2): 31.2 (10-18 @ 19:25)  BSA (m2): 1.74 (10-18 @ 19:25)      General: Alert and oriented, No acute distress.  Eye: Pupils are equal, round and reactive to light, Extraocular movements are intact, Normal conjunctiva.  HENT: Normocephalic, Oral mucosa is moist, No pharyngeal erythema, No sinus tenderness.  Neck: Supple, No lymphadenopathy.  Respiratory: Lungs are clear to auscultation, Respirations are non-labored.  Cardiovascular: Normal rate, Regular rhythm, No murmur, Good pulses equal in all extremities, No edema.  Gastrointestinal: Soft, Non-tender, Non-distended, Normal bowel sounds.  Genitourinary: No costovertebral angle tenderness.  Lymphatics: No lymphadenopathy neck, axilla, groin.  Musculoskeletal: Normal range of motion, Normal strength.  Integumentary: No rash.  Neurologic: Alert, Oriented, No focal deficits, Cranial Nerves II-XII are grossly intact.  Psychiatric: Appropriate mood & affect.      =======================================================  Labs:  ====    Labs:  10-19    139  |  105  |  11.0  ----------------------------<  103  4.1   |  23.0  |  0.68    Ca    7.6<L>      19 Oct 2017 06:13    TPro  6.3<L>  /  Alb  3.0<L>  /  TBili  0.3<L>  /  DBili  x   /  AST  39<H>  /  ALT  38<H>  /  AlkPhos  139<H>  10-19                          12.2   6.1   )-----------( 226      ( 19 Oct 2017 08:23 )             37.3       PT/INR - ( 18 Oct 2017 20:05 )   PT: 12.7 sec;   INR: 1.15 ratio         PTT - ( 18 Oct 2017 20:05 )  PTT:28.2 sec  Urinalysis Basic - ( 18 Oct 2017 21:39 )    Color: Yellow / Appearance: Clear / S.015 / pH: x  Gluc: x / Ketone: Trace  / Bili: Negative / Urobili: 1 mg/dL   Blood: x / Protein: 30 mg/dL / Nitrite: Negative   Leuk Esterase: Trace / RBC: 0-2 /HPF / WBC 0-2   Sq Epi: x / Non Sq Epi: Occasional / Bacteria: Occasional      LIVER FUNCTIONS - ( 19 Oct 2017 06:13 )  Alb: 3.0 g/dL / Pro: 6.3 g/dL / ALK PHOS: 139 U/L / ALT: 38 U/L / AST: 39 U/L / GGT: x                RECENT CULTURES:  10-19 @ 08:58      RVP  NotDetec         =================     EXAM:  CT ABDOMEN AND PELVIS                         EXAM:  CT CHEST                          PROCEDURE DATE:  10/18/2017          INTERPRETATION:  CLINICAL INFORMATION: Fever.    TECHNIQUE: Noncontrast CT of the chest, abdomen and pelvis was performed   with coronal and sagittal reformats. No oral contrast was administered.    COMPARISON: There is no prior study for comparison.    FINDINGS:   CHEST:  Evaluation is limited without intravenous contrast enhancement,   particularly of the mediastinum, vasculature, pleura and soft tissues.     Lungs and airways: The tracheobronchial tree is patent. Linear   subsegmental lingular atelectasis. There is no pulmonary nodule, mass or   consolidation. There is no pleural effusion or pneumothorax.     Heart and vessels: The heart size is normal. There is no pericardial   effusion. The thoracic aorta and main pulmonary artery are normal   caliber.     Mediastinum and soft tissues: There are coarse calcifications in the   visualized right thyroid gland. There is no axillary, mediastinal or   hilar adenopathy.     Bones: Spinal degenerative changes.    ABDOMEN AND PELVIS:  Evaluation of the solid organs and viscera is limited without intravenous   contrast enhancement.     Hepatobiliary: Hepatic cysts and subcentimeter hypodense hepatic lesions   too small to characterize. No biliary ductal dilation. Gallbladder within   normal limits.  Pancreas: Within normal limits.  Spleen: Within normal limits.  Adrenals: Within normal limits.    Kidneys/Ureters/Bladder: Within normal limits.  Reproductive Organs: Within normal limits.    Bowel/Peritoneum: Small hiatal hernia. Portions of the gastrointestinal   tract are collapsed, limiting evaluation. No bowel obstruction,   inflammation or pneumoperitoneum.  Normal appendix.    Vessels:  Within normal limits.  Lymphatics/Retroperitoneum: No lymphadenopathy. No retroperitoneal   hematoma.      Soft Tissues: Within normal limits.  Bones: Within normal limits.    IMPRESSION:  CT Chest: No pulmonary consolidation. Linear subsegmental lingular   atelectasis.    CT Abdomen and Pelvis: No discernible acute abnormality.             PHILOMENA REGALADO M.D., ATTENDING RADIOLOGIST  This document has been electronically signed. Oct 18 2017 10:23PM        ==============     EXAM:  CT SINUSES                          PROCEDURE DATE:  10/18/2017          INTERPRETATION:  HISTORY: Fever    COMPARISON: None.    TECHNIQUE: Axial noncontrast CT of the face was obtained and submitted   for interpretation. Sagittal and coronal reformatted images were   provided. Bone and soft tissue windows were evaluated.     FINDINGS:     Aside from trace ethmoid sinus mucosal thickening, the maxillary,   sphenoid, and frontal sinuses are clear without mucosal thickening or   fluid level. Mastoid air cells are clear bilaterally.    The globes are intact without retrobulbar hematoma. The lenses are   located bilaterally.    The mandibular condyles and temporomandibular joints are intact.     The nasopharyngeal contours are unremarkable.    IMPRESSION:     Aside from trace ethmoid sinus mucosal thickening, clear paranasal   sinuses and mastoid air cells.    IZAIAH VALDEZ M.D., ATTENDING RADIOLOGIST  This document has been electronically signed. Oct 18 2017 10:12PM NPP INFECTIOUS DISEASES AND INTERNAL MEDICINE OF [place]  =======================================================  Mitch Lemos MD [facility]   Jhonny Lemus MD  Diplomates American Board of Internal Medicine and Infectious Diseases  =======================================================  [facility]-[id number]  BLANK AKHTAR is a 57y  Female   This 56 yo female with prediabetes, who is here for fevers. back in , had ER visit for painful rash on back on right side.  Told that she had shingles and sent out.   She went to see dermatology and told that it was poison ivy.   since then, patient had been having intermittent fevers of 102 103 at home.  She has been taking advil twice a day.     she has no complaints whatsoever. No abd pain, no cough, no vomiting, no dysuria, no vaginal discharge, no pelvic pain, no lower extremity pain/swelling.   In ED, fever 101.4. Cultures sent.   CT chest abd pelvis and CXR are not revealing and without findings to explain this.   patient started empirically on ertapenem and vancomycin.    no sick contacts, no travel history    =======================================================  Past Medical & Surgical Hx:  =====================  PAST MEDICAL & SURGICAL HISTORY:  No pertinent past medical history  Delivery with history of     Problem List:  ==========  HEALTH ISSUES - PROBLEM Dx:    Social Hx:  =======  no toxic habits currently    FAMILY HISTORY:    Allergies  No Known Allergies  Intolerances       MEDICATIONS  (STANDING):  dextrose 5%. 1000 milliLiter(s) (50 mL/Hr) IV Continuous <Continuous>  dextrose 50% Injectable 12.5 Gram(s) IV Push once  dextrose 50% Injectable 25 Gram(s) IV Push once  dextrose 50% Injectable 25 Gram(s) IV Push once  ertapenem  IVPB 1000 milliGRAM(s) IV Intermittent every 24 hours  insulin lispro (HumaLOG) corrective regimen sliding scale   SubCutaneous three times a day before meals  sodium chloride 0.9%. 1000 milliLiter(s) (100 mL/Hr) IV Continuous <Continuous>  vancomycin  IVPB 1250 milliGRAM(s) IV Intermittent <User Schedule>    MEDICATIONS  (PRN):  acetaminophen   Tablet 650 milliGRAM(s) Oral every 6 hours PRN For Temp greater than 38 C (100.4 F)  dextrose Gel 1 Dose(s) Oral once PRN Blood Glucose LESS THAN 70 milliGRAM(s)/deciliter  glucagon  Injectable 1 milliGRAM(s) IntraMuscular once PRN Glucose LESS THAN 70 milligrams/deciliter        =======================================================  REVIEW OF SYSTEMS:  Constitutional: No fever, No chills, No sweats.  Eye: No icterus, No double vision.  Ear/Nose/Mouth/Throat: No nasal congestion, No sore throat.  Respiratory: No shortness of breath, No cough, No sputum production, No wheezing.  Cardiovascular: No chest pain, No palpitations, No syncope.  Gastrointestinal: No nausea, No vomiting, No diarrhea, No abdominal pain.  Genitourinary: No dysuria, No hematuria, No change in urine stream.  Hematology/Lymphatics: No bleeding tendency.  Endocrine: No excessive thirst, No polyuria.  Immunologic: No malaise.  Musculoskeletal: No back pain, No neck pain, No joint pain, No muscle pain.  Integumentary: No rash, No pruritus, No skin lesion.  Neurologic: No numbness, No tingling, No headache.  Psychiatric: No depression.    =======================================================    Physical Exam:  ============  Vital Signs Last 24 Hrs  T(C): 37.3 (19 Oct 2017 12:04), Max: 38.7 (19 Oct 2017 09:31)  T(F): 99.2 (19 Oct 2017 12:04), Max: 101.7 (19 Oct 2017 09:31)  HR: 105 (19 Oct 2017 12:04) (78 - 127)  BP: 117/56 (19 Oct 2017 12:04) (88/50 - 117/56)  RR: 20 (19 Oct 2017 12:04) (18 - 20)  SpO2: 95% (19 Oct 2017 12:04) (94% - 99%)  Height (cm): 154.94 (10-18 @ 19:25)  Weight (kg): 74.8 (10-18 @ 19:25)  BMI (kg/m2): 31.2 (10-18 @ 19:25)  BSA (m2): 1.74 (10-18 @ 19:25)    General: Alert and oriented, No acute distress. NON TOXIX  Eye: Pupils are equal, round and reactive to light, Extraocular movements are intact, Normal conjunctiva.  HENT: Normocephalic, Oral mucosa is moist, No pharyngeal erythema, No sinus tenderness.  Neck: Supple, No lymphadenopathy.  Respiratory: Lungs are clear to auscultation, Respirations are non-labored.  Cardiovascular: Normal rate, Regular rhythm, No murmur, Good pulses equal in all extremities, No edema.  Gastrointestinal: Soft, Non-tender, Non-distended, Normal bowel sounds.  Genitourinary: No costovertebral angle tenderness.  Lymphatics: No lymphadenopathy neck, axilla, groin.  Musculoskeletal: Normal range of motion, Normal strength.  Integumentary: No rash CURRENTLY.   Neurologic: Alert, Oriented, No focal deficits, Cranial Nerves II-XII are grossly intact.  Psychiatric: Appropriate mood & affect.      =======================================================  Labs:  ====    Labs:  10-19    139  |  105  |  11.0  ----------------------------<  103  4.1   |  23.0  |  0.68    Ca    7.6<L>      19 Oct 2017 06:13    TPro  6.3<L>  /  Alb  3.0<L>  /  TBili  0.3<L>  /  DBili  x   /  AST  39<H>  /  ALT  38<H>  /  AlkPhos  139<H>  10-19                          12.2   6.1   )-----------( 226      ( 19 Oct 2017 08:23 )             37.3       PT/INR - ( 18 Oct 2017 20:05 )   PT: 12.7 sec;   INR: 1.15 ratio         PTT - ( 18 Oct 2017 20:05 )  PTT:28.2 sec  Urinalysis Basic - ( 18 Oct 2017 21:39 )    Color: Yellow / Appearance: Clear / S.015 / pH: x  Gluc: x / Ketone: Trace  / Bili: Negative / Urobili: 1 mg/dL   Blood: x / Protein: 30 mg/dL / Nitrite: Negative   Leuk Esterase: Trace / RBC: 0-2 /HPF / WBC 0-2   Sq Epi: x / Non Sq Epi: Occasional / Bacteria: Occasional      LIVER FUNCTIONS - ( 19 Oct 2017 06:13 )  Alb: 3.0 g/dL / Pro: 6.3 g/dL / ALK PHOS: 139 U/L / ALT: 38 U/L / AST: 39 U/L / GGT: x           RECENT CULTURES:  10-19 @ 08:58      RVP  NotDetec         =================     EXAM:  CT ABDOMEN AND PELVIS                         EXAM:  CT CHEST                          PROCEDURE DATE:  10/18/2017          INTERPRETATION:  CLINICAL INFORMATION: Fever.    TECHNIQUE: Noncontrast CT of the chest, abdomen and pelvis was performed   with coronal and sagittal reformats. No oral contrast was administered.    COMPARISON: There is no prior study for comparison.    FINDINGS:   CHEST:  Evaluation is limited without intravenous contrast enhancement,   particularly of the mediastinum, vasculature, pleura and soft tissues.     Lungs and airways: The tracheobronchial tree is patent. Linear   subsegmental lingular atelectasis. There is no pulmonary nodule, mass or   consolidation. There is no pleural effusion or pneumothorax.     Heart and vessels: The heart size is normal. There is no pericardial   effusion. The thoracic aorta and main pulmonary artery are normal   caliber.     Mediastinum and soft tissues: There are coarse calcifications in the   visualized right thyroid gland. There is no axillary, mediastinal or   hilar adenopathy.     Bones: Spinal degenerative changes.    ABDOMEN AND PELVIS:  Evaluation of the solid organs and viscera is limited without intravenous   contrast enhancement.     Hepatobiliary: Hepatic cysts and subcentimeter hypodense hepatic lesions   too small to characterize. No biliary ductal dilation. Gallbladder within   normal limits.  Pancreas: Within normal limits.  Spleen: Within normal limits.  Adrenals: Within normal limits.    Kidneys/Ureters/Bladder: Within normal limits.  Reproductive Organs: Within normal limits.    Bowel/Peritoneum: Small hiatal hernia. Portions of the gastrointestinal   tract are collapsed, limiting evaluation. No bowel obstruction,   inflammation or pneumoperitoneum.  Normal appendix.    Vessels:  Within normal limits.  Lymphatics/Retroperitoneum: No lymphadenopathy. No retroperitoneal   hematoma.      Soft Tissues: Within normal limits.  Bones: Within normal limits.    IMPRESSION:  CT Chest: No pulmonary consolidation. Linear subsegmental lingular   atelectasis.    CT Abdomen and Pelvis: No discernible acute abnormality.             PHILOMENA REGALADO M.D., ATTENDING RADIOLOGIST  This document has been electronically signed. Oct 18 2017 10:23PM        ==============     EXAM:  CT SINUSES                          PROCEDURE DATE:  10/18/2017          INTERPRETATION:  HISTORY: Fever    COMPARISON: None.    TECHNIQUE: Axial noncontrast CT of the face was obtained and submitted   for interpretation. Sagittal and coronal reformatted images were   provided. Bone and soft tissue windows were evaluated.     FINDINGS:     Aside from trace ethmoid sinus mucosal thickening, the maxillary,   sphenoid, and frontal sinuses are clear without mucosal thickening or   fluid level. Mastoid air cells are clear bilaterally.    The globes are intact without retrobulbar hematoma. The lenses are   located bilaterally.    The mandibular condyles and temporomandibular joints are intact.     The nasopharyngeal contours are unremarkable.    IMPRESSION:     Aside from trace ethmoid sinus mucosal thickening, clear paranasal   sinuses and mastoid air cells.    IZAIAH VALDEZ M.D., ATTENDING RADIOLOGIST  This document has been electronically signed. Oct 18 2017 10:12PM NPP INFECTIOUS DISEASES AND INTERNAL MEDICINE OF [place]  =======================================================  Mitch Lemos MD [facility]   Jhonny Lemus MD  Diplomates American Board of Internal Medicine and Infectious Diseases  =======================================================  [facility]-[id number]  BLANK AKHTAR is a 57y  Female   This 56 yo female with prediabetes, who is here for fevers. back in , had ER visit for painful rash on back on right side.  Told that she had shingles and sent out.   She went to see dermatology and told that it was poison ivy.   since then, patient had been having intermittent fevers of 102 103 at home.  She has been taking advil twice a day.     she has no complaints whatsoever. No abd pain, no cough, no vomiting, no dysuria, no vaginal discharge, no pelvic pain, no lower extremity pain/swelling.   In ED, fever 101.4. Cultures sent.   CT chest abd pelvis and CXR are not revealing and without findings to explain this.   patient started empirically on ertapenem and vancomycin.    no sick contacts, no travel history. no clear tick bites insect bites, no outdoor activity.     =======================================================  Past Medical & Surgical Hx:  =====================  PAST MEDICAL & SURGICAL HISTORY:  No pertinent past medical history  Delivery with history of     Problem List:  ==========  HEALTH ISSUES - PROBLEM Dx:    Social Hx:  =======  no toxic habits currently    FAMILY HISTORY:    Allergies  No Known Allergies  Intolerances       MEDICATIONS  (STANDING):  dextrose 5%. 1000 milliLiter(s) (50 mL/Hr) IV Continuous <Continuous>  dextrose 50% Injectable 12.5 Gram(s) IV Push once  dextrose 50% Injectable 25 Gram(s) IV Push once  dextrose 50% Injectable 25 Gram(s) IV Push once  ertapenem  IVPB 1000 milliGRAM(s) IV Intermittent every 24 hours  insulin lispro (HumaLOG) corrective regimen sliding scale   SubCutaneous three times a day before meals  sodium chloride 0.9%. 1000 milliLiter(s) (100 mL/Hr) IV Continuous <Continuous>  vancomycin  IVPB 1250 milliGRAM(s) IV Intermittent <User Schedule>    MEDICATIONS  (PRN):  acetaminophen   Tablet 650 milliGRAM(s) Oral every 6 hours PRN For Temp greater than 38 C (100.4 F)  dextrose Gel 1 Dose(s) Oral once PRN Blood Glucose LESS THAN 70 milliGRAM(s)/deciliter  glucagon  Injectable 1 milliGRAM(s) IntraMuscular once PRN Glucose LESS THAN 70 milligrams/deciliter        =======================================================  REVIEW OF SYSTEMS:  Constitutional: No fever, No chills, No sweats.  Eye: No icterus, No double vision.  Ear/Nose/Mouth/Throat: No nasal congestion, No sore throat.  Respiratory: No shortness of breath, No cough, No sputum production, No wheezing.  Cardiovascular: No chest pain, No palpitations, No syncope.  Gastrointestinal: No nausea, No vomiting, No diarrhea, No abdominal pain.  Genitourinary: No dysuria, No hematuria, No change in urine stream.  Hematology/Lymphatics: No bleeding tendency.  Endocrine: No excessive thirst, No polyuria.  Immunologic: No malaise.  Musculoskeletal: No back pain, No neck pain, No joint pain, No muscle pain.  Integumentary: No rash, No pruritus, No skin lesion.  Neurologic: No numbness, No tingling, No headache.  Psychiatric: No depression.    =======================================================    Physical Exam:  ============  Vital Signs Last 24 Hrs  T(C): 37.3 (19 Oct 2017 12:04), Max: 38.7 (19 Oct 2017 09:31)  T(F): 99.2 (19 Oct 2017 12:04), Max: 101.7 (19 Oct 2017 09:31)  HR: 105 (19 Oct 2017 12:04) (78 - 127)  BP: 117/56 (19 Oct 2017 12:04) (88/50 - 117/56)  RR: 20 (19 Oct 2017 12:04) (18 - 20)  SpO2: 95% (19 Oct 2017 12:04) (94% - 99%)  Height (cm): 154.94 (10-18 @ 19:25)  Weight (kg): 74.8 (10-18 @ 19:25)  BMI (kg/m2): 31.2 (10-18 @ 19:25)  BSA (m2): 1.74 (10-18 @ 19:25)    General: Alert and oriented, No acute distress. NON TOXIX  Eye: Pupils are equal, round and reactive to light, Extraocular movements are intact, Normal conjunctiva.  HENT: Normocephalic, Oral mucosa is moist, No pharyngeal erythema, No sinus tenderness.  Neck: Supple, No lymphadenopathy.  Respiratory: Lungs are clear to auscultation, Respirations are non-labored.  Cardiovascular: Normal rate, Regular rhythm, No murmur, Good pulses equal in all extremities, No edema.  Gastrointestinal: Soft, Non-tender, Non-distended, Normal bowel sounds.  Genitourinary: No costovertebral angle tenderness.  Lymphatics: No lymphadenopathy neck, axilla, groin.  Musculoskeletal: Normal range of motion, Normal strength.  Integumentary: No rash CURRENTLY.   Neurologic: Alert, Oriented, No focal deficits, Cranial Nerves II-XII are grossly intact.  Psychiatric: Appropriate mood & affect.      =======================================================  Labs:  ====    Labs:  10-19    139  |  105  |  11.0  ----------------------------<  103  4.1   |  23.0  |  0.68    Ca    7.6<L>      19 Oct 2017 06:13    TPro  6.3<L>  /  Alb  3.0<L>  /  TBili  0.3<L>  /  DBili  x   /  AST  39<H>  /  ALT  38<H>  /  AlkPhos  139<H>  10-19                          12.2   6.1   )-----------( 226      ( 19 Oct 2017 08:23 )             37.3       PT/INR - ( 18 Oct 2017 20:05 )   PT: 12.7 sec;   INR: 1.15 ratio         PTT - ( 18 Oct 2017 20:05 )  PTT:28.2 sec  Urinalysis Basic - ( 18 Oct 2017 21:39 )    Color: Yellow / Appearance: Clear / S.015 / pH: x  Gluc: x / Ketone: Trace  / Bili: Negative / Urobili: 1 mg/dL   Blood: x / Protein: 30 mg/dL / Nitrite: Negative   Leuk Esterase: Trace / RBC: 0-2 /HPF / WBC 0-2   Sq Epi: x / Non Sq Epi: Occasional / Bacteria: Occasional      LIVER FUNCTIONS - ( 19 Oct 2017 06:13 )  Alb: 3.0 g/dL / Pro: 6.3 g/dL / ALK PHOS: 139 U/L / ALT: 38 U/L / AST: 39 U/L / GGT: x           RECENT CULTURES:  10-19 @ 08:58      RVP  NotDetec         =================     EXAM:  CT ABDOMEN AND PELVIS                         EXAM:  CT CHEST                          PROCEDURE DATE:  10/18/2017          INTERPRETATION:  CLINICAL INFORMATION: Fever.    TECHNIQUE: Noncontrast CT of the chest, abdomen and pelvis was performed   with coronal and sagittal reformats. No oral contrast was administered.    COMPARISON: There is no prior study for comparison.    FINDINGS:   CHEST:  Evaluation is limited without intravenous contrast enhancement,   particularly of the mediastinum, vasculature, pleura and soft tissues.     Lungs and airways: The tracheobronchial tree is patent. Linear   subsegmental lingular atelectasis. There is no pulmonary nodule, mass or   consolidation. There is no pleural effusion or pneumothorax.     Heart and vessels: The heart size is normal. There is no pericardial   effusion. The thoracic aorta and main pulmonary artery are normal   caliber.     Mediastinum and soft tissues: There are coarse calcifications in the   visualized right thyroid gland. There is no axillary, mediastinal or   hilar adenopathy.     Bones: Spinal degenerative changes.    ABDOMEN AND PELVIS:  Evaluation of the solid organs and viscera is limited without intravenous   contrast enhancement.     Hepatobiliary: Hepatic cysts and subcentimeter hypodense hepatic lesions   too small to characterize. No biliary ductal dilation. Gallbladder within   normal limits.  Pancreas: Within normal limits.  Spleen: Within normal limits.  Adrenals: Within normal limits.    Kidneys/Ureters/Bladder: Within normal limits.  Reproductive Organs: Within normal limits.    Bowel/Peritoneum: Small hiatal hernia. Portions of the gastrointestinal   tract are collapsed, limiting evaluation. No bowel obstruction,   inflammation or pneumoperitoneum.  Normal appendix.    Vessels:  Within normal limits.  Lymphatics/Retroperitoneum: No lymphadenopathy. No retroperitoneal   hematoma.      Soft Tissues: Within normal limits.  Bones: Within normal limits.    IMPRESSION:  CT Chest: No pulmonary consolidation. Linear subsegmental lingular   atelectasis.    CT Abdomen and Pelvis: No discernible acute abnormality.             PHILOMENA REGALADO M.D., ATTENDING RADIOLOGIST  This document has been electronically signed. Oct 18 2017 10:23PM        ==============     EXAM:  CT SINUSES                          PROCEDURE DATE:  10/18/2017          INTERPRETATION:  HISTORY: Fever    COMPARISON: None.    TECHNIQUE: Axial noncontrast CT of the face was obtained and submitted   for interpretation. Sagittal and coronal reformatted images were   provided. Bone and soft tissue windows were evaluated.     FINDINGS:     Aside from trace ethmoid sinus mucosal thickening, the maxillary,   sphenoid, and frontal sinuses are clear without mucosal thickening or   fluid level. Mastoid air cells are clear bilaterally.    The globes are intact without retrobulbar hematoma. The lenses are   located bilaterally.    The mandibular condyles and temporomandibular joints are intact.     The nasopharyngeal contours are unremarkable.    IMPRESSION:     Aside from trace ethmoid sinus mucosal thickening, clear paranasal   sinuses and mastoid air cells.    IZAIAH VALDEZ M.D., ATTENDING RADIOLOGIST  This document has been electronically signed. Oct 18 2017 10:12PM

## 2017-10-19 NOTE — ED ADULT NURSE REASSESSMENT NOTE - NS ED NURSE REASSESS COMMENT FT1
BP 88/50; MD Levy made aware; 1L NS Bolus administered to pt.  Pt. asymptomatic, will continue to monitor.
Pt. resting comfortably on stretcher.  No complaints of pain.  Denies sob or cook.  VSS.  Pt. s/p IVF; tolerated well.  In no apparent distress, will continue to monitor.
Pt. s/p 1L NS bolus; MD Somers at bedside; pt. to be started on 100ml/hr maintenance IVF.  No complaints of pain.  In no apparent distress, will continue to monitor.
Patient received at 0747; awake; alert and oriented x4. Denies any pain or discomfort at this time. Denies SOB, dizziness. No distress noted. VSS. Respirations unlabored. Report received at bedside. Cardiac monitor in place. NSR. Call bell and personal items in reach. Continue to monitor patient and maintain safety.

## 2017-10-19 NOTE — H&P ADULT - NSHPPHYSICALEXAM_GEN_ALL_CORE
EXAM:  Vital Signs Last 24 Hrs  T(C): 37.3 (18 Oct 2017 21:50), Max: 38.6 (18 Oct 2017 19:25)  T(F): 99.1 (18 Oct 2017 21:50), Max: 101.4 (18 Oct 2017 19:25)  HR: 85 (18 Oct 2017 23:50) (85 - 127)  BP: 88/50 (18 Oct 2017 23:50) (88/50 - 108/55)  BP(mean): --  RR: 20 (18 Oct 2017 23:50) (18 - 20)  SpO2: 94% (18 Oct 2017 23:50) (94% - 95%)    GENERAL NAD,   HEENT: NORMAL CEPHALIC ATRAUMATIC, EOMI, MOIST MUCUS MEBRANES  NECK SUPPLE  CVS S1S2, RRR,   RESP BLCTA, NO RHONCHI  ABD SOFT, NON TENDER, NON DISTENDED  EXT NO EDEMA  NEURO MOVES ALL 4 EXTREMITES  PSYCH APPROPRIATE MOOD  SKIN WARM, DRY

## 2017-10-19 NOTE — H&P ADULT - ASSESSMENT
Pt is a 58 yo female with pmh of pre diabetes presenting with fevers. Pt states that 2-3 weeks ago she had a D and C. Then 2 weeks ago she noticed having fevers on a daily basis. Fever is as high as 102. Pt has been taking advil twice a day. No abd pain, no cough, no vomiting, no dysuria, no vaginal discharge, no pelvic pain, no lower extremity pain/swelling.   Pt recently presented to ED with rash thought to be shingles. Pt followed up with dermatology and diagnosed instead with poison ivy rash which has now resolved.     -Fever of unknown origin:  follow up septic work, esr, crp, procalcitonin  check hepatitis panel, HIV ordered.   recent D and C, check pelvic sono.   ID consult    -Pre diabetes:  check fingersticks, treat with sliding scale if required,   check a1c    -Dvt ppx:  scds. Pt is a 58 yo female with pmh of pre diabetes presenting with fevers. Pt states that 2-3 weeks ago she had a D and C. Then 2 weeks ago she noticed having fevers on a daily basis. Fever is as high as 102. Pt has been taking advil twice a day. No abd pain, no cough, no vomiting, no dysuria, no vaginal discharge, no pelvic pain, no lower extremity pain/swelling.   Pt recently presented to ED with rash thought to be shingles. Pt followed up with dermatology and diagnosed instead with poison ivy rash which has now resolved.     -Fever of unknown origin:  follow up septic work, esr, crp, procalcitonin.  check kun, RF, lyme titres  check hepatitis panel, HIV ordered.   recent D and C, check pelvic sono.   ID consult    -Pre diabetes:  check fingersticks, treat with sliding scale if required,   check a1c    -Dvt ppx:  scds. Pt is a 58 yo female with pmh of pre diabetes presenting with fevers. Pt states that 2-3 weeks ago she had a D and C. Then 2 weeks ago she noticed having fevers on a daily basis. Fever is as high as 102. Pt has been taking advil twice a day. No abd pain, no cough, no vomiting, no dysuria, no vaginal discharge, no pelvic pain, no lower extremity pain/swelling.   Pt recently presented to ED with rash thought to be shingles. Pt followed up with dermatology and diagnosed instead with poison ivy rash which has now resolved.     -Fever of unknown origin:  follow up septic work, esr, crp, procalcitonin.  check kun, RF, lyme titres  check hepatitis panel, HIV ordered.   recent D and C, check pelvic sono.   ID consult in am   will cw vanco and ertapanem for now.    -Pre diabetes:  check fingersticks, treat with sliding scale if required,   check a1c    -Dvt ppx:  scds.

## 2017-10-20 LAB
ANION GAP SERPL CALC-SCNC: 8 MMOL/L — SIGNIFICANT CHANGE UP (ref 5–17)
BUN SERPL-MCNC: 6 MG/DL — LOW (ref 8–20)
CALCIUM SERPL-MCNC: 7.3 MG/DL — LOW (ref 8.6–10.2)
CHLORIDE SERPL-SCNC: 105 MMOL/L — SIGNIFICANT CHANGE UP (ref 98–107)
CO2 SERPL-SCNC: 24 MMOL/L — SIGNIFICANT CHANGE UP (ref 22–29)
CREAT SERPL-MCNC: 0.59 MG/DL — SIGNIFICANT CHANGE UP (ref 0.5–1.3)
GLUCOSE BLDC GLUCOMTR-MCNC: 101 MG/DL — HIGH (ref 70–99)
GLUCOSE BLDC GLUCOMTR-MCNC: 110 MG/DL — HIGH (ref 70–99)
GLUCOSE SERPL-MCNC: 135 MG/DL — HIGH (ref 70–115)
HBA1C BLD-MCNC: 5.7 % — HIGH (ref 4–5.6)
M TB TUBERC IFN-G BLD QL: -0.04 IU/ML — SIGNIFICANT CHANGE UP
M TB TUBERC IFN-G BLD QL: 2.82 IU/ML — SIGNIFICANT CHANGE UP
M TB TUBERC IFN-G BLD QL: NEGATIVE — SIGNIFICANT CHANGE UP
MITOGEN IGNF BCKGRD COR BLD-ACNC: 2.53 IU/ML — SIGNIFICANT CHANGE UP
POTASSIUM SERPL-MCNC: 3.3 MMOL/L — LOW (ref 3.5–5.3)
POTASSIUM SERPL-SCNC: 3.3 MMOL/L — LOW (ref 3.5–5.3)
RHEUMATOID FACT SERPL-ACNC: <7 IU/ML — SIGNIFICANT CHANGE UP (ref 0–13.9)
SODIUM SERPL-SCNC: 137 MMOL/L — SIGNIFICANT CHANGE UP (ref 135–145)
T PALLIDUM AB TITR SER: NEGATIVE — SIGNIFICANT CHANGE UP
VANCOMYCIN TROUGH SERPL-MCNC: 12 UG/ML — SIGNIFICANT CHANGE UP (ref 10–20)

## 2017-10-20 PROCEDURE — 99233 SBSQ HOSP IP/OBS HIGH 50: CPT

## 2017-10-20 RX ORDER — POTASSIUM CHLORIDE 20 MEQ
40 PACKET (EA) ORAL ONCE
Qty: 0 | Refills: 0 | Status: COMPLETED | OUTPATIENT
Start: 2017-10-20 | End: 2017-10-20

## 2017-10-20 RX ORDER — ACETAMINOPHEN 500 MG
1000 TABLET ORAL ONCE
Qty: 0 | Refills: 0 | Status: COMPLETED | OUTPATIENT
Start: 2017-10-20 | End: 2017-10-20

## 2017-10-20 RX ADMIN — Medication 30 MILLILITER(S): at 11:43

## 2017-10-20 RX ADMIN — Medication 40 MILLIEQUIVALENT(S): at 09:46

## 2017-10-20 RX ADMIN — Medication 400 MILLIGRAM(S): at 04:32

## 2017-10-20 RX ADMIN — Medication 650 MILLIGRAM(S): at 15:41

## 2017-10-20 RX ADMIN — SODIUM CHLORIDE 100 MILLILITER(S): 9 INJECTION INTRAMUSCULAR; INTRAVENOUS; SUBCUTANEOUS at 10:58

## 2017-10-20 RX ADMIN — SODIUM CHLORIDE 100 MILLILITER(S): 9 INJECTION INTRAMUSCULAR; INTRAVENOUS; SUBCUTANEOUS at 23:23

## 2017-10-20 RX ADMIN — Medication 166.67 MILLIGRAM(S): at 18:29

## 2017-10-20 RX ADMIN — ERTAPENEM SODIUM 120 MILLIGRAM(S): 1 INJECTION, POWDER, LYOPHILIZED, FOR SOLUTION INTRAMUSCULAR; INTRAVENOUS at 21:15

## 2017-10-20 RX ADMIN — SODIUM CHLORIDE 100 MILLILITER(S): 9 INJECTION INTRAMUSCULAR; INTRAVENOUS; SUBCUTANEOUS at 00:54

## 2017-10-20 RX ADMIN — SODIUM CHLORIDE 2000 MILLILITER(S): 9 INJECTION, SOLUTION INTRAVENOUS at 00:38

## 2017-10-20 RX ADMIN — Medication 166.67 MILLIGRAM(S): at 05:40

## 2017-10-20 RX ADMIN — Medication 650 MILLIGRAM(S): at 23:31

## 2017-10-20 NOTE — CHART NOTE - NSCHARTNOTEFT_GEN_A_CORE
NOTE DELAYED DUE TO COMPUTER ERROR    Rapid Response/code sepsis PGY 3 Note    HPI:  Pt is a 58 yo female with pmh of pre diabetes presenting with fevers. Pt states that 2-3 weeks ago she had a D and C. Then 2 weeks ago she noticed having fevers on a daily basis. Fever is as high as 102. Pt has been taking advil twice a day. No abd pain, no cough, no vomiting, no dysuria, no vaginal discharge, no pelvic pain, no lower extremity pain/swelling.   Pt recently presented to ED with rash thought to be shingles. Pt followed up with dermatology and diagnosed instead with poison ivy rash which has now resolved.     In ED, fever 101.4.Cultures sent. (19 Oct 2017 02:32)      ***rapid response code sepsis called because of fever, hypoxia, tachypnea, and tachycardia  Vital signs at code incluse a temp of  103.2  , Hr of 113    , /62   , RR  24 and O2 saturation of  98%  WBC is    mental status is altered at baseline  Pt has source of infection ( )    Allergies    No Known Allergies    Intolerances      PAST MEDICAL & SURGICAL HISTORY:  No pertinent past medical history  Delivery with history of     Vital Signs Last 24 Hrs  T(C): 37.4 (19 Oct 2017 21:40), Max: 39.6 (19 Oct 2017 20:08)  T(F): 99.4 (19 Oct 2017 21:40), Max: 103.2 (19 Oct 2017 20:08)  HR: 109 (19 Oct 2017 21:40) (78 - 120)  BP: 105/54 (19 Oct 2017 21:40) (90/50 - 128/60)  BP(mean): --  RR: 27 (19 Oct 2017 21:40) (18 - 27)  SpO2: 92% (19 Oct 2017 21:40) (92% - 99%)    PHYSICAL EXAM  GENERAL: The patient is awake and alert in no apparent distress.   HEENT: Head is normocephalic and atraumatic. Extraocular muscles are intact. Mucous membranes are moist. No throat erythema/exudates no lymphadenopathy, no JVD,   NECK: Supple.  LUNGS: Clear to auscultation BL without wheezing, rales or rhonchi; respirations unlabored  HEART: Regular rate and rhythm ,+S1/+S2, no murmurs, rubs, gallops  ABDOMEN: Soft, nontender, and nondistended, no rebound, guarding rigidity, bowel sounds in all 4 quadrants  EXTREMITIES: Without any cyanosis, clubbing, rash, lesions or edema.  SKIN: No new rashes or lesions.  MSK: strength equal BL  VASCULAR: Radial and Dorsal pedal pulses palpable BL  NEUROLOGIC: Grossly intact.  PSYCH: No new changes.                            11.1   6.5   )-----------( 219      ( 19 Oct 2017 20:33 )             33.8     10-19    139  |  105  |  11.0  ----------------------------<  103  4.1   |  23.0  |  0.68    Ca    7.6<L>      19 Oct 2017 06:13    TPro  6.3<L>  /  Alb  3.0<L>  /  TBili  0.3<L>  /  DBili  x   /  AST  39<H>  /  ALT  38<H>  /  AlkPhos  139<H>  10-19      LIVER FUNCTIONS - ( 19 Oct 2017 06:13 )  Alb: 3.0 g/dL / Pro: 6.3 g/dL / ALK PHOS: 139 U/L / ALT: 38 U/L / AST: 39 U/L / GGT: x         Urinalysis Basic - ( 18 Oct 2017 21:39 )    Color: Yellow / Appearance: Clear / S.015 / pH: x  Gluc: x / Ketone: Trace  / Bili: Negative / Urobili: 1 mg/dL   Blood: x / Protein: 30 mg/dL / Nitrite: Negative   Leuk Esterase: Trace / RBC: 0-2 /HPF / WBC 0-2   Sq Epi: x / Non Sq Epi: Occasional / Bacteria: Occasional       PT/INR - ( 18 Oct 2017 20:05 )   PT: 12.7 sec;   INR: 1.15 ratio         PTT - ( 18 Oct 2017 20:05 )  PTT:28.2 sec    Assessment- Rapid Response called for 57y year old Female admitted for     Plan-  stat lactate and cultures (blood x 2, urine)   1L of fluids initially given pending lactate. Pt restarted on vanco and Invanz.   Lactate came back elevated at 2.2 and another 1.5 L were given.   Pt was reevaluated prior to repeat lactate resulted at [id number]  VS showed a sbp 84 with a rr 30 An addition 1L bolus given  Pt appeared in NAD. She did not look dry with good capillary refill and skin turgor. Lungs sounded clear. Pt states she has been voiding well and it making an ample amount of water.   repeat lactate 1.3   Case discussed with nocturnist who will evaluate pt , but clinically is stable

## 2017-10-20 NOTE — PROGRESS NOTE ADULT - SUBJECTIVE AND OBJECTIVE BOX
BLANK AKHTAR  ----------------------------------------  The patient was seen and evaluated for fevers.  The patient is in no acute distress.  Denied any chest pain, palpitations, dyspnea, or abdominal pain.    Vital Signs Last 24 Hrs  T(C): 36.7 (20 Oct 2017 07:55), Max: 39.6 (19 Oct 2017 20:08)  T(F): 98.1 (20 Oct 2017 07:55), Max: 103.2 (19 Oct 2017 20:08)  HR: 84 (20 Oct 2017 07:55) (84 - 120)  BP: 93/52 (20 Oct 2017 07:55) (89/58 - 128/60)  BP(mean): --  RR: 20 (20 Oct 2017 07:55) (18 - 28)  SpO2: 92% (20 Oct 2017 04:21) (92% - 95%)    CAPILLARY BLOOD GLUCOSE  110 (20 Oct 2017 11:02)  110 (20 Oct 2017 07:44)  143 (19 Oct 2017 20:18)    POCT Blood Glucose.: 110 mg/dL (20 Oct 2017 11:01)  POCT Blood Glucose.: 110 mg/dL (20 Oct 2017 07:44)  POCT Blood Glucose.: 143 mg/dL (19 Oct 2017 20:05)  POCT Blood Glucose.: 96 mg/dL (19 Oct 2017 17:31)  POCT Blood Glucose.: 123 mg/dL (19 Oct 2017 13:36)    PHYSICAL EXAMINATION:  ----------------------------------------  General appearance: NAD, Awake, Alert  HEENT: NCAT, Conjunctiva clear, EOMI  Neck: Supple, No JVD, No tenderness  Lungs: Clear to auscultation, Breath sound equal bilaterally, No wheezes, No rales  Cardiovascular: S1S2, Regular rhythm  Abdomen: Soft, Nontender, Nondistended, No guarding/rebound, Positive bowel sounds  Extremities: No clubbing, No cyanosis, No edema, No calf tenderness  Neuro: Strength equal bilaterally, No tremors  Psychiatric: Appropriate mood, Normal affect    LABORATORY STUDIES:  ----------------------------------------             11.1   6.5   )-----------( 219      ( 19 Oct 2017 20:33 )             33.8     10-    137  |  105  |  6.0<L>  ----------------------------<  135<H>  3.3<L>   |  24.0  |  0.59    Ca    7.3<L>      20 Oct 2017 07:45    TPro  6.3<L>  /  Alb  3.0<L>  /  TBili  0.3<L>  /  DBili  x   /  AST  39<H>  /  ALT  38<H>  /  AlkPhos  139<H>  10    LIVER FUNCTIONS - ( 19 Oct 2017 06:13 )  Alb: 3.0 g/dL / Pro: 6.3 g/dL / ALK PHOS: 139 U/L / ALT: 38 U/L / AST: 39 U/L / GGT: x           PT/INR - ( 18 Oct 2017 20:05 )   PT: 12.7 sec;   INR: 1.15 ratio    PTT - ( 18 Oct 2017 20:05 )  PTT:28.2 sec    Urinalysis Basic - ( 18 Oct 2017 21:39 )  Color: Yellow / Appearance: Clear / S.015 / pH: x  Gluc: x / Ketone: Trace  / Bili: Negative / Urobili: 1 mg/dL   Blood: x / Protein: 30 mg/dL / Nitrite: Negative   Leuk Esterase: Trace / RBC: 0-2 /HPF / WBC 0-2   Sq Epi: x / Non Sq Epi: Occasional / Bacteria: Occasional    Culture - Urine (collected 18 Oct 2017 21:38)  Source: .Urine Clean Catch (Midstream)  Final Report (19 Oct 2017 16:39):    No growth    MEDICATIONS  (STANDING):  dextrose 5%. 1000 milliLiter(s) (50 mL/Hr) IV Continuous <Continuous>  dextrose 50% Injectable 12.5 Gram(s) IV Push once  dextrose 50% Injectable 25 Gram(s) IV Push once  dextrose 50% Injectable 25 Gram(s) IV Push once  ertapenem  IVPB      ertapenem  IVPB 1000 milliGRAM(s) IV Intermittent every 24 hours  influenza   Vaccine 0.5 milliLiter(s) IntraMuscular once  insulin lispro (HumaLOG) corrective regimen sliding scale   SubCutaneous three times a day before meals  sodium chloride 0.9%. 1000 milliLiter(s) (100 mL/Hr) IV Continuous <Continuous>  vancomycin  IVPB      vancomycin  IVPB 1250 milliGRAM(s) IV Intermittent every 12 hours    MEDICATIONS  (PRN):  acetaminophen   Tablet 650 milliGRAM(s) Oral every 6 hours PRN For Temp greater than 38 C (100.4 F)  aluminum hydroxide/magnesium hydroxide/simethicone Suspension 30 milliLiter(s) Oral every 6 hours PRN Dyspepsia  dextrose Gel 1 Dose(s) Oral once PRN Blood Glucose LESS THAN 70 milliGRAM(s)/deciliter  glucagon  Injectable 1 milliGRAM(s) IntraMuscular once PRN Glucose LESS THAN 70 milligrams/deciliter      ASSESSMENT / PLAN:  ----------------------------------------  Fever - No obvious source of infection noted. Infectious Disease consultation noted. Culture results to be followed. On empiric antibiotics. Serologies to be followed. Pelvic ultrasound was without significant findings. CT of the sinuses, chest, abdomen, and pelvis were unremarkable. Antibiotics were initially held but later resumed after the patient was noted to be febrile and hypotensive.    Prediabetes - On insulin coverage as needed. Consistent carbohydrate diet.    Hyponatremia - Sodium level improved on repeat laboratory studies. BLANK AKHTAR  ----------------------------------------  The patient was seen and evaluated for fevers.  The patient is in no acute distress.  Denied any chest pain, palpitations, dyspnea, or abdominal pain.    57F presented with recurrent fevers. CT of the sinus noted trace ethmoid sinus mucosal thickening, clear paranasal sinuses and mastoid air cells. CT of the chest, abdomen, and pelvis noted no pulmonary consolidation, hepatic cysts and subcentimeter hypodense hepatic lesions too small to characterize, no biliary ductal dilation, no bowel obstruction, inflammation, pneumoperitoneum, lymphadenopathy, or retroperitoneal hematoma. Empiric antibiotics were initiated for fever of unknown origin. The patient was seen by Infectious Disease in consultation and antibiotics were discontinued and the patient monitored clinically while awaiting test results. The patient was noted to have a had a recent dilatation and curettage and pelvic ultrasound was found to be unremarkable. The patient developed fever and hypotension overnight requiring intravenous fluids and resumption of antibiotics.	    Vital Signs Last 24 Hrs  T(C): 36.7 (20 Oct 2017 07:55), Max: 39.6 (19 Oct 2017 20:08)  T(F): 98.1 (20 Oct 2017 07:55), Max: 103.2 (19 Oct 2017 20:08)  HR: 84 (20 Oct 2017 07:55) (84 - 120)  BP: 93/52 (20 Oct 2017 07:55) (89/58 - 128/60)  BP(mean): --  RR: 20 (20 Oct 2017 07:55) (18 - 28)  SpO2: 92% (20 Oct 2017 04:21) (92% - 95%)    CAPILLARY BLOOD GLUCOSE  110 (20 Oct 2017 11:02)  110 (20 Oct 2017 07:44)  143 (19 Oct 2017 20:18)    POCT Blood Glucose.: 110 mg/dL (20 Oct 2017 11:01)  POCT Blood Glucose.: 110 mg/dL (20 Oct 2017 07:44)  POCT Blood Glucose.: 143 mg/dL (19 Oct 2017 20:05)  POCT Blood Glucose.: 96 mg/dL (19 Oct 2017 17:31)  POCT Blood Glucose.: 123 mg/dL (19 Oct 2017 13:36)    PHYSICAL EXAMINATION:  ----------------------------------------  General appearance: NAD, Awake, Alert  HEENT: NCAT, Conjunctiva clear, EOMI  Neck: Supple, No JVD, No tenderness  Lungs: Clear to auscultation, Breath sound equal bilaterally, No wheezes, No rales  Cardiovascular: S1S2, Regular rhythm  Abdomen: Soft, Nontender, Nondistended, No guarding/rebound, Positive bowel sounds  Extremities: No clubbing, No cyanosis, No edema, No calf tenderness  Neuro: Strength equal bilaterally, No tremors  Psychiatric: Appropriate mood, Normal affect    LABORATORY STUDIES:  ----------------------------------------             11.1   6.5   )-----------( 219      ( 19 Oct 2017 20:33 )             33.8     10-20    137  |  105  |  6.0<L>  ----------------------------<  135<H>  3.3<L>   |  24.0  |  0.59    Ca    7.3<L>      20 Oct 2017 07:45    TPro  6.3<L>  /  Alb  3.0<L>  /  TBili  0.3<L>  /  DBili  x   /  AST  39<H>  /  ALT  38<H>  /  AlkPhos  139<H>  10-19    LIVER FUNCTIONS - ( 19 Oct 2017 06:13 )  Alb: 3.0 g/dL / Pro: 6.3 g/dL / ALK PHOS: 139 U/L / ALT: 38 U/L / AST: 39 U/L / GGT: x           PT/INR - ( 18 Oct 2017 20:05 )   PT: 12.7 sec;   INR: 1.15 ratio    PTT - ( 18 Oct 2017 20:05 )  PTT:28.2 sec    Urinalysis Basic - ( 18 Oct 2017 21:39 )  Color: Yellow / Appearance: Clear / S.015 / pH: x  Gluc: x / Ketone: Trace  / Bili: Negative / Urobili: 1 mg/dL   Blood: x / Protein: 30 mg/dL / Nitrite: Negative   Leuk Esterase: Trace / RBC: 0-2 /HPF / WBC 0-2   Sq Epi: x / Non Sq Epi: Occasional / Bacteria: Occasional    Culture - Urine (collected 18 Oct 2017 21:38)  Source: .Urine Clean Catch (Midstream)  Final Report (19 Oct 2017 16:39):    No growth    MEDICATIONS  (STANDING):  dextrose 5%. 1000 milliLiter(s) (50 mL/Hr) IV Continuous <Continuous>  dextrose 50% Injectable 12.5 Gram(s) IV Push once  dextrose 50% Injectable 25 Gram(s) IV Push once  dextrose 50% Injectable 25 Gram(s) IV Push once  ertapenem  IVPB      ertapenem  IVPB 1000 milliGRAM(s) IV Intermittent every 24 hours  influenza   Vaccine 0.5 milliLiter(s) IntraMuscular once  insulin lispro (HumaLOG) corrective regimen sliding scale   SubCutaneous three times a day before meals  sodium chloride 0.9%. 1000 milliLiter(s) (100 mL/Hr) IV Continuous <Continuous>  vancomycin  IVPB      vancomycin  IVPB 1250 milliGRAM(s) IV Intermittent every 12 hours    MEDICATIONS  (PRN):  acetaminophen   Tablet 650 milliGRAM(s) Oral every 6 hours PRN For Temp greater than 38 C (100.4 F)  aluminum hydroxide/magnesium hydroxide/simethicone Suspension 30 milliLiter(s) Oral every 6 hours PRN Dyspepsia  dextrose Gel 1 Dose(s) Oral once PRN Blood Glucose LESS THAN 70 milliGRAM(s)/deciliter  glucagon  Injectable 1 milliGRAM(s) IntraMuscular once PRN Glucose LESS THAN 70 milligrams/deciliter      ASSESSMENT / PLAN:  ----------------------------------------  Fever - No obvious source of infection noted. Infectious Disease consultation noted. Culture results to be followed. On empiric antibiotics. Serologies to be followed. Pelvic ultrasound was without significant findings. CT of the sinuses, chest, abdomen, and pelvis were unremarkable. Antibiotics were initially held but later resumed after the patient was noted to be febrile and hypotensive.    Prediabetes - On insulin coverage as needed. Consistent carbohydrate diet.    Hyponatremia - Sodium level improved on repeat laboratory studies.

## 2017-10-21 DIAGNOSIS — Z46.89 ENCOUNTER FOR FITTING AND ADJUSTMENT OF OTHER SPECIFIED DEVICES: ICD-10-CM

## 2017-10-21 LAB
A PHAGOCYTOPH IGG TITR SER IF: SIGNIFICANT CHANGE UP TITER
ANA PAT FLD IF-IMP: ABNORMAL
ANA TITR SER: ABNORMAL
ANION GAP SERPL CALC-SCNC: 11 MMOL/L — SIGNIFICANT CHANGE UP (ref 5–17)
B BURGDOR AB SER QL IA: NEGATIVE — SIGNIFICANT CHANGE UP
B MICROTI IGG TITR SER: SIGNIFICANT CHANGE UP TITER
BUN SERPL-MCNC: 7 MG/DL — LOW (ref 8–20)
CALCIUM SERPL-MCNC: 7.7 MG/DL — LOW (ref 8.6–10.2)
CHLORIDE SERPL-SCNC: 103 MMOL/L — SIGNIFICANT CHANGE UP (ref 98–107)
CO2 SERPL-SCNC: 24 MMOL/L — SIGNIFICANT CHANGE UP (ref 22–29)
CREAT SERPL-MCNC: 0.58 MG/DL — SIGNIFICANT CHANGE UP (ref 0.5–1.3)
CRP SERPL-MCNC: 4.9 MG/DL — HIGH (ref 0–0.4)
E CHAFFEENSIS IGG TITR SER IF: SIGNIFICANT CHANGE UP TITER
ERYTHROCYTE [SEDIMENTATION RATE] IN BLOOD: 35 MM/HR — HIGH (ref 0–20)
GLUCOSE BLDC GLUCOMTR-MCNC: 107 MG/DL — HIGH (ref 70–99)
GLUCOSE BLDC GLUCOMTR-MCNC: 91 MG/DL — SIGNIFICANT CHANGE UP (ref 70–99)
GLUCOSE SERPL-MCNC: 103 MG/DL — SIGNIFICANT CHANGE UP (ref 70–115)
POTASSIUM SERPL-MCNC: 3.7 MMOL/L — SIGNIFICANT CHANGE UP (ref 3.5–5.3)
POTASSIUM SERPL-SCNC: 3.7 MMOL/L — SIGNIFICANT CHANGE UP (ref 3.5–5.3)
SODIUM SERPL-SCNC: 138 MMOL/L — SIGNIFICANT CHANGE UP (ref 135–145)
VANCOMYCIN TROUGH SERPL-MCNC: 9 UG/ML — LOW (ref 10–20)

## 2017-10-21 PROCEDURE — 99232 SBSQ HOSP IP/OBS MODERATE 35: CPT

## 2017-10-21 PROCEDURE — 99233 SBSQ HOSP IP/OBS HIGH 50: CPT

## 2017-10-21 RX ADMIN — Medication 650 MILLIGRAM(S): at 21:13

## 2017-10-21 RX ADMIN — SODIUM CHLORIDE 100 MILLILITER(S): 9 INJECTION INTRAMUSCULAR; INTRAVENOUS; SUBCUTANEOUS at 21:13

## 2017-10-21 RX ADMIN — SODIUM CHLORIDE 100 MILLILITER(S): 9 INJECTION INTRAMUSCULAR; INTRAVENOUS; SUBCUTANEOUS at 08:44

## 2017-10-21 RX ADMIN — Medication 166.67 MILLIGRAM(S): at 08:43

## 2017-10-21 NOTE — PROGRESS NOTE ADULT - ASSESSMENT
57F presented with recurrent fevers. CT of the sinus noted trace ethmoid sinus mucosal thickening, clear paranasal sinuses and mastoid air cells. CT of the chest, abdomen, and pelvis noted no pulmonary consolidation, hepatic cysts and subcentimeter hypodense hepatic lesions too small to characterize, no biliary ductal dilation, no bowel obstruction, inflammation, pneumoperitoneum, lymphadenopathy, or retroperitoneal hematoma. Empiric antibiotics were initiated for fever of unknown origin. The patient was seen by Infectious Disease in consultation and antibiotics were discontinued and the patient monitored clinically while awaiting test results. The patient was noted to have a had a recent dilatation and curettage and pelvic ultrasound was found to be unremarkable. The patient developed fever and hypotension overnight requiring intravenous fluids and resumption of antibiotics.	    ASSESSMENT / PLAN:  ----------------------------------------  Fever - No obvious source of infection noted. Infectious Disease consultation noted.   Culture results to be followed and negative so far  Empiric antibiotics discontinued today. Serologies to be followed. Pelvic ultrasound was without significant findings. CT of the sinuses, chest, abdomen, and pelvis were unremarkable.  (Antibiotics were initially held but later resumed after the patient was noted to be febrile and hypotensive.)  Patient noted she had an OB procedure on 10/3 and her symptoms started 3-5 days afterwards, but she can't remember the procedure she had.  I called her OB Dr. Trever Hsieh @ [phone] and her PMD Dr. Cm to ascertain what procedure she had - ? pessary placement, but not confirmed as yet.    Prediabetes - A1C=5.7.  fingersticks and insulin coverage orders discontinued at patient's request and regular diet ordered    Hyponatremia - Sodium level improved on repeat laboratory studies.

## 2017-10-21 NOTE — PROGRESS NOTE ADULT - SUBJECTIVE AND OBJECTIVE BOX
CC: Patient denied any new complaints today.  She had a temp of 103 last night and had chills, but is feeling better now.  HPI:  Pt is a 58 yo female with pmh of pre diabetes presenting with fevers. Pt states that 2-3 weeks ago she had a D and C. Then 2 weeks ago she noticed having fevers on a daily basis. Fever is as high as 102. Pt has been taking advil twicw a day. No abd pain, no cough, no vomiting, no dysuria, no vaginal discharge, no pelvic pain, no lower extremity pain/swelling.   Pt recently presented to ED with rash thought to be shingles. Pt followed up with dermatology and diagnosed instead with poison ivy rash which has now resolved.     In ED, fever 101.4.Cultures sent. (19 Oct 2017 02:32)    REVIEW OF SYSTEMS:    Patient denied fever, chills, abdominal pain, nausea, vomiting, cough, shortness of breath, chest pain or palpitations    Vital Signs Last 24 Hrs  T(C): 37.5 (21 Oct 2017 15:18), Max: 39.4 (20 Oct 2017 23:23)  T(F): 99.5 (21 Oct 2017 15:18), Max: 102.9 (20 Oct 2017 23:23)  HR: 103 (21 Oct 2017 15:18) (96 - 105)  BP: 125/71 (21 Oct 2017 15:18) (100/63 - 135/67)  BP(mean): --  RR: 18 (21 Oct 2017 15:18) (18 - 20)  SpO2: 97% (21 Oct 2017 15:18) (93% - 97%)I&O's Summary    20 Oct 2017 07:01  -  21 Oct 2017 07:00  --------------------------------------------------------  IN: 2450 mL / OUT: 0 mL / NET: 2450 mL    CAPILLARY BLOOD GLUCOSE  107 (21 Oct 2017 11:07)  91 (21 Oct 2017 07:51)  110 (20 Oct 2017 16:09)    PHYSICAL EXAM:  GENERAL: NAD, well-groomed  HEENT: PERRL, +EOMI, anicteric, no [place]  NECK: Supple, No JVD   CHEST/LUNG: CTA bilaterally; Normal effort  HEART: S1S2 Normal intensity, no murmurs, gallops or rubs noted  ABDOMEN: Soft, protuberant, BS Normoactive, NT, ND, no HSM noted  EXTREMITIES:  2+ radial and DP pulses noted, no clubbing, cyanosis, or edema noted, FROM x 4  SKIN: No rashes or lesions noted  NEURO: A&Ox3, no focal deficits noted, CN II-XII intact  PSYCH: normal mood and affect; insight/judgement appropriate  LABS:                        11.1   6.5   )-----------( 219      ( 19 Oct 2017 20:33 )             33.8     10-21    138  |  103  |  7.0<L>  ----------------------------<  103  3.7   |  24.0  |  0.58    Ca    7.7<L>      21 Oct 2017 07:50          RADIOLOGY & ADDITIONAL TESTS:    MEDICATIONS:  MEDICATIONS  (STANDING):  sodium chloride 0.9%. 1000 milliLiter(s) (100 mL/Hr) IV Continuous <Continuous>    MEDICATIONS  (PRN):  acetaminophen   Tablet 650 milliGRAM(s) Oral every 6 hours PRN For Temp greater than 38 C (100.4 F)  aluminum hydroxide/magnesium hydroxide/simethicone Suspension 30 milliLiter(s) Oral every 6 hours PRN Dyspepsia

## 2017-10-21 NOTE — PROGRESS NOTE ADULT - ASSESSMENT
FEVER -NO SOURCE  HAS PESSARY IN AND PER PT FEVER SINCE THEN    WOULD CALL GYN TO REMOVE PESSARY  NO EVID ON TIC RELATED ISSES  AFF ALL ABS    R/O PMR PENDING GYN EVAL

## 2017-10-21 NOTE — PROGRESS NOTE ADULT - SUBJECTIVE AND OBJECTIVE BOX
[facility] [place] MELISSA CONLEY MD FACP   MARCE KUMAR MD  Diplomates American Board of Internal Medicine and Infectious Diseases      BLANK AKHTAR  MRN-[id number]  57y    INTERVAL HPI/OVERNIGHT EVENTS:    FEVER CONTINUES  ALL C/S NEG    FEVER X 14 DAYS SINCE PESSARY PLACED  NL LABS AND CT  NO HA/MUSCLE PAINS    Vital Signs Last 24 Hrs  T(C): 37.1 (21 Oct 2017 07:35), Max: 39.4 (20 Oct 2017 23:23)  T(F): 98.7 (21 Oct 2017 07:35), Max: 102.9 (20 Oct 2017 23:23)  HR: 96 (21 Oct 2017 07:35) (96 - 105)  BP: 100/63 (21 Oct 2017 07:35) (100/63 - 135/67)  BP(mean): --  RR: 20 (21 Oct 2017 07:35) (18 - 20)  SpO2: 93% (20 Oct 2017 23:23) (93% - 95%)    ANTIBIOTICS  influenza   Vaccine 0.5 milliLiter(s) IntraMuscular once  ALL STOPPED    Allergies    No Known Allergies    Intolerances        REVIEW OF SYSTEMS:NEG    PHYSICAL EXAM:  Vital Signs Last 24 Hrs  T(C): 37.1 (21 Oct 2017 07:35), Max: 39.4 (20 Oct 2017 23:23)  T(F): 98.7 (21 Oct 2017 07:35), Max: 102.9 (20 Oct 2017 23:23)  HR: 96 (21 Oct 2017 07:35) (96 - 105)  BP: 100/63 (21 Oct 2017 07:35) (100/63 - 135/67)  BP(mean): --  RR: 20 (21 Oct 2017 07:35) (18 - 20)  SpO2: 93% (20 Oct 2017 23:23) (93% - 95%)      GEN: NAD, pleasant  HEENT: normocephalic and atraumatic. EOMI. MARISELA. Moist mucosa. Clear Posterior pharynx.  NECK: Supple. No carotid bruits.  No lymphadenopathy or thyromegaly.  LUNGS: Clear to auscultation.  HEART: Regular rate and rhythm without murmur.  ABDOMEN: Soft, nontender, and nondistended.  Positive bowel sounds.  No hepatosplenomegaly was noted.  EXTREMITIES: Without any cyanosis, clubbing, rash, lesions or edema.  NEUROLOGIC: Cranial nerves II through XII are grossly intact.  MUSCULOSKELETAL:  SKIN: No ulceration or induration present    LABS:                        11.1   6.5   )-----------( 219      ( 19 Oct 2017 20:33 )             33.8       10-21    138  |  103  |  7.0<L>  ----------------------------<  103  3.7   |  24.0  |  0.58    Ca    7.7<L>      21 Oct 2017 07:50          10-19 @ 20:33  hct 33.8 % hgb 11.1 g/dL    10-19 @ 20:33  plat 219 K/uL wbc 6.5 K/uL    10-19 @ 08:23  hct 37.3 % hgb 12.2 g/dL    10-19 @ 08:23  plat 226 K/uL wbc 6.1 K/uL    10-18 @ 20:05  hct 40.9 % hgb 13.0 g/dL    10-18 @ 20:05  plat 252 K/uL wbc 8.4 K/uL      10-21-17  creat 0.58 mg/dL gfr 117 mL/min/1.73M2 bun 7.0 mg/dL k 3.7 mmol/L  10-20-17  creat 0.59 mg/dL gfr 116 mL/min/1.73M2 bun 6.0 mg/dL k 3.3 mmol/L  10-19-17  creat 0.68 mg/dL gfr 111 mL/min/1.73M2 bun 11.0 mg/dL k 4.1 mmol/L  10-18-17  creat 0.89 mg/dL gfr 82 mL/min/1.73M2 bun 14.0 mg/dL k 4.2 mmol/L      MICROBIOLOGY:  Culture Results:   No growth (10-18 @ 21:38)  Culture Results:   No growth at 48 hours (10-18 @ 20:09)  Culture Results:   No growth at 48 hours (10-18 @ 20:09)        RADIOLOGY & ADDITIONAL STUDIES:          ARNULFO CONLEY MD FACP

## 2017-10-22 LAB
ALBUMIN SERPL ELPH-MCNC: 2.6 G/DL — LOW (ref 3.3–5.2)
ALP SERPL-CCNC: 142 U/L — HIGH (ref 40–120)
ALT FLD-CCNC: 64 U/L — HIGH
AST SERPL-CCNC: 90 U/L — HIGH
BILIRUB DIRECT SERPL-MCNC: 0.1 MG/DL — SIGNIFICANT CHANGE UP (ref 0–0.3)
BILIRUB INDIRECT FLD-MCNC: 0.2 MG/DL — SIGNIFICANT CHANGE UP (ref 0.2–1)
BILIRUB SERPL-MCNC: 0.3 MG/DL — LOW (ref 0.4–2)
CULTURE RESULTS: SIGNIFICANT CHANGE UP
PROT SERPL-MCNC: 6.2 G/DL — LOW (ref 6.6–8.7)
SPECIMEN SOURCE: SIGNIFICANT CHANGE UP

## 2017-10-22 PROCEDURE — 99232 SBSQ HOSP IP/OBS MODERATE 35: CPT

## 2017-10-22 RX ADMIN — Medication 650 MILLIGRAM(S): at 21:21

## 2017-10-22 RX ADMIN — SODIUM CHLORIDE 100 MILLILITER(S): 9 INJECTION INTRAMUSCULAR; INTRAVENOUS; SUBCUTANEOUS at 21:20

## 2017-10-22 RX ADMIN — SODIUM CHLORIDE 100 MILLILITER(S): 9 INJECTION INTRAMUSCULAR; INTRAVENOUS; SUBCUTANEOUS at 06:31

## 2017-10-22 RX ADMIN — Medication 30 MILLILITER(S): at 21:22

## 2017-10-22 RX ADMIN — Medication 650 MILLIGRAM(S): at 06:32

## 2017-10-22 RX ADMIN — SODIUM CHLORIDE 100 MILLILITER(S): 9 INJECTION INTRAMUSCULAR; INTRAVENOUS; SUBCUTANEOUS at 11:40

## 2017-10-22 NOTE — PROGRESS NOTE ADULT - ASSESSMENT
FEVER -NO SOURCE - RESOLVING OFF ALL ABS  HAS PESSARY IN AND PER PT FEVER SINCE THEN    WOULD CALL GYN TO REMOVE PESSARY    NO INDICATIONS FOR ABS

## 2017-10-22 NOTE — PROGRESS NOTE ADULT - SUBJECTIVE AND OBJECTIVE BOX
[facility] [place] MELISSA CONLEY MD FACP   MARCE KUMAR MD  Diplomates American Board of Internal Medicine and Infectious Diseases      BLANK AKHTAR  MRN-[id number]  57y    INTERVAL HPI/OVERNIGHT EVENTS:  AFEBRILE OFFABS  FEELS WELL  ALL C/S NEG    FEVER X 14 DAYS SINCE PESSARY PLACED  NL LABS AND CT  NO HA/MUSCLE PAINS    Vital Signs Last 24 Hrs  T(C): 37.1 (21 Oct 2017 07:35), Max: 39.4 (20 Oct 2017 23:23)  T(F): 98.7 (21 Oct 2017 07:35), Max: 102.9 (20 Oct 2017 23:23)  HR: 96 (21 Oct 2017 07:35) (96 - 105)  BP: 100/63 (21 Oct 2017 07:35) (100/63 - 135/67)  BP(mean): --  RR: 20 (21 Oct 2017 07:35) (18 - 20)  SpO2: 93% (20 Oct 2017 23:23) (93% - 95%)    ANTIBIOTICS  influenza   Vaccine 0.5 milliLiter(s) IntraMuscular once  ALL STOPPED    Allergies    No Known Allergies    Intolerances        REVIEW OF SYSTEMS:NEG    PHYSICAL EXAM:  Vital Signs Last 24 Hrs  T(C): 37.1 (21 Oct 2017 07:35), Max: 39.4 (20 Oct 2017 23:23)  T(F): 98.7 (21 Oct 2017 07:35), Max: 102.9 (20 Oct 2017 23:23)  HR: 96 (21 Oct 2017 07:35) (96 - 105)  BP: 100/63 (21 Oct 2017 07:35) (100/63 - 135/67)  BP(mean): --  RR: 20 (21 Oct 2017 07:35) (18 - 20)  SpO2: 93% (20 Oct 2017 23:23) (93% - 95%)      GEN: NAD, pleasant  HEENT: normocephalic and atraumatic. EOMI. MARISELA. Moist mucosa. Clear Posterior pharynx.  NECK: Supple. No carotid bruits.  No lymphadenopathy or thyromegaly.  LUNGS: Clear to auscultation.  HEART: Regular rate and rhythm without murmur.  ABDOMEN: Soft, nontender, and nondistended.  Positive bowel sounds.  No hepatosplenomegaly was noted.  EXTREMITIES: Without any cyanosis, clubbing, rash, lesions or edema.  NEUROLOGIC: Cranial nerves II through XII are grossly intact.  MUSCULOSKELETAL:  SKIN: No ulceration or induration present    LABS:                        11.1   6.5   )-----------( 219      ( 19 Oct 2017 20:33 )             33.8       10-21    138  |  103  |  7.0<L>  ----------------------------<  103  3.7   |  24.0  |  0.58    Ca    7.7<L>      21 Oct 2017 07:50          10-19 @ 20:33  hct 33.8 % hgb 11.1 g/dL    10-19 @ 20:33  plat 219 K/uL wbc 6.5 K/uL    10-19 @ 08:23  hct 37.3 % hgb 12.2 g/dL    10-19 @ 08:23  plat 226 K/uL wbc 6.1 K/uL    10-18 @ 20:05  hct 40.9 % hgb 13.0 g/dL    10-18 @ 20:05  plat 252 K/uL wbc 8.4 K/uL      10-21-17  creat 0.58 mg/dL gfr 117 mL/min/1.73M2 bun 7.0 mg/dL k 3.7 mmol/L  10-20-17  creat 0.59 mg/dL gfr 116 mL/min/1.73M2 bun 6.0 mg/dL k 3.3 mmol/L  10-19-17  creat 0.68 mg/dL gfr 111 mL/min/1.73M2 bun 11.0 mg/dL k 4.1 mmol/L  10-18-17  creat 0.89 mg/dL gfr 82 mL/min/1.73M2 bun 14.0 mg/dL k 4.2 mmol/L      MICROBIOLOGY:  Culture Results:   No growth (10-18 @ 21:38)  Culture Results:   No growth at 48 hours (10-18 @ 20:09)  Culture Results:   No growth at 48 hours (10-18 @ 20:09)        RADIOLOGY & ADDITIONAL STUDIES:          ARNULFO CONLEY MD FACP

## 2017-10-22 NOTE — PROGRESS NOTE ADULT - ASSESSMENT
57F presented with recurrent fevers. CT of the sinus noted trace ethmoid sinus mucosal thickening, clear paranasal sinuses and mastoid air cells. CT of the chest, abdomen, and pelvis noted no pulmonary consolidation, hepatic cysts and subcentimeter hypodense hepatic lesions too small to characterize, no biliary ductal dilation, no bowel obstruction, inflammation, pneumoperitoneum, lymphadenopathy, or retroperitoneal hematoma. Empiric antibiotics were initiated for fever of unknown origin. The patient was seen by Infectious Disease in consultation and antibiotics were discontinued and the patient monitored clinically while awaiting test results. The patient was noted to have a had a recent dilatation and curettage and pelvic ultrasound was found to be unremarkable. The patient developed fever and hypotension overnight requiring intravenous fluids and resumption of antibiotics.	    ASSESSMENT / PLAN:  ----------------------------------------  Fever - No obvious source of infection noted. ? Viral in nature  - Infectious Disease consultation noted - off antibiotics  - Culture results to be followed and negative so far  - Serologies to be followed. Pelvic ultrasound was without significant findings. CT of the sinuses, chest, abdomen, and pelvis were unremarkable.    - Patient noted she had an OB procedure on 10/3 and her symptoms started 3-5 days afterwards, but she can't remember the procedure she had.  I called her OB Dr. Trever Hsieh @ [phone] and her PMD Dr. Cm to ascertain what procedure she had - ? pessary placement, but not confirmed as yet.  I will call her doctor in am.  I consulted OB here for eval - Dr. Dinesh Montalvo to evaluate as well.    Elevated LFTs - ? etiology - possibly viral   - follow up serologies.  consider GI eval if continues to worsen, check LFTs in am    Prediabetes - diet control  - A1C=5.7.  fingersticks and insulin coverage orders discontinued at patient's request and regular diet ordered    Hyponatremia - Resolved  - Sodium level improved on repeat laboratory studies. 57F presented with recurrent fevers. CT of the sinus noted trace ethmoid sinus mucosal thickening, clear paranasal sinuses and mastoid air cells. CT of the chest, abdomen, and pelvis noted no pulmonary consolidation, hepatic cysts and subcentimeter hypodense hepatic lesions too small to characterize, no biliary ductal dilation, no bowel obstruction, inflammation, pneumoperitoneum, lymphadenopathy, or retroperitoneal hematoma. Empiric antibiotics were initiated for fever of unknown origin. The patient was seen by Infectious Disease in consultation and antibiotics were discontinued and the patient monitored clinically while awaiting test results. The patient was noted to have a had a recent dilatation and curettage and pelvic ultrasound was found to be unremarkable. The patient developed fever and hypotension overnight requiring intravenous fluids and resumption of antibiotics.	    ASSESSMENT / PLAN:  ----------------------------------------  Fever - No obvious source of infection noted. ? Viral in nature  - Infectious Disease consultation noted - off antibiotics  - Culture results to be followed and negative so far  - Serologies to be followed. Pelvic ultrasound was without significant findings. CT of the sinuses, chest, abdomen, and pelvis were unremarkable.    - Patient noted she had an OB procedure on 10/3 and her symptoms started 3-5 days afterwards, but she can't remember the procedure she had.  I called her OB Dr. Trever Hsieh @ [phone] and her PMD Dr. Cm to ascertain what procedure she had - ? pessary placement, but not confirmed as yet.  I will call her doctor in am.  I consulted OB here for eval - Dr. Dinesh Montalvo to evaluate as well.    Elevated LFTs - ? etiology - possibly viral vs autoimmune as EMIR is +  - follow up serologies / viral workup.    - consider GI eval if continues to worsen, check LFTs in am    Prediabetes - diet control  - A1C=5.7.  fingersticks and insulin coverage orders discontinued at patient's request and regular diet ordered    Hyponatremia - Resolved  - Sodium level improved on repeat laboratory studies.

## 2017-10-23 ENCOUNTER — TRANSCRIPTION ENCOUNTER (OUTPATIENT)
Age: 57
End: 2017-10-23

## 2017-10-23 VITALS
RESPIRATION RATE: 18 BRPM | OXYGEN SATURATION: 84 % | SYSTOLIC BLOOD PRESSURE: 118 MMHG | HEART RATE: 97 BPM | TEMPERATURE: 99 F | DIASTOLIC BLOOD PRESSURE: 68 MMHG

## 2017-10-23 DIAGNOSIS — R50.9 FEVER, UNSPECIFIED: ICD-10-CM

## 2017-10-23 LAB
ALBUMIN SERPL ELPH-MCNC: 2.4 G/DL — LOW (ref 3.3–5.2)
ALP SERPL-CCNC: 147 U/L — HIGH (ref 40–120)
ALT FLD-CCNC: 53 U/L — HIGH
ANISOCYTOSIS BLD QL: SLIGHT — SIGNIFICANT CHANGE UP
AST SERPL-CCNC: 65 U/L — HIGH
BASOPHILS # BLD AUTO: 0.2 K/UL — SIGNIFICANT CHANGE UP (ref 0–0.2)
BILIRUB DIRECT SERPL-MCNC: 0.1 MG/DL — SIGNIFICANT CHANGE UP (ref 0–0.3)
BILIRUB INDIRECT FLD-MCNC: 0.2 MG/DL — SIGNIFICANT CHANGE UP (ref 0.2–1)
BILIRUB SERPL-MCNC: 0.3 MG/DL — LOW (ref 0.4–2)
CMV IGG FLD QL: 0.73 U/ML — HIGH
CMV IGG SERPL-IMP: POSITIVE
CMV IGM FLD-ACNC: 182 AU/ML — HIGH
CMV IGM SERPL QL: POSITIVE
CRP SERPL-MCNC: 4.2 MG/DL — HIGH (ref 0–0.4)
CULTURE RESULTS: SIGNIFICANT CHANGE UP
CULTURE RESULTS: SIGNIFICANT CHANGE UP
EBV EA AB SER IA-ACNC: <5 U/ML — SIGNIFICANT CHANGE UP
EBV EA AB TITR SER IF: POSITIVE
EBV EA IGG SER-ACNC: NEGATIVE — SIGNIFICANT CHANGE UP
EBV NA IGG SER IA-ACNC: 182 U/ML — HIGH
EBV PATRN SPEC IB-IMP: SIGNIFICANT CHANGE UP
EBV VCA IGG AVIDITY SER QL IA: POSITIVE
EBV VCA IGM SER IA-ACNC: 106 U/ML — HIGH
EBV VCA IGM SER IA-ACNC: >160 U/ML — HIGH
EBV VCA IGM TITR FLD: POSITIVE
EOSINOPHIL # BLD AUTO: 0.2 K/UL — SIGNIFICANT CHANGE UP (ref 0–0.5)
EOSINOPHIL NFR BLD AUTO: 2 % — SIGNIFICANT CHANGE UP (ref 0–5)
ERYTHROCYTE [SEDIMENTATION RATE] IN BLOOD: 30 MM/HR — HIGH (ref 0–20)
HCT VFR BLD CALC: 33.8 % — LOW (ref 37–47)
HGB BLD-MCNC: 10.6 G/DL — LOW (ref 12–16)
HYPOCHROMIA BLD QL: SLIGHT — SIGNIFICANT CHANGE UP
LYMPHOCYTES # BLD AUTO: 4.7 K/UL — SIGNIFICANT CHANGE UP (ref 1–4.8)
LYMPHOCYTES # BLD AUTO: 42 % — SIGNIFICANT CHANGE UP (ref 20–55)
MACROCYTES BLD QL: SLIGHT — SIGNIFICANT CHANGE UP
MCHC RBC-ENTMCNC: 29 PG — SIGNIFICANT CHANGE UP (ref 27–31)
MCHC RBC-ENTMCNC: 31.4 G/DL — LOW (ref 32–36)
MCV RBC AUTO: 92.3 FL — SIGNIFICANT CHANGE UP (ref 81–99)
MICROCYTES BLD QL: SLIGHT — SIGNIFICANT CHANGE UP
MONOCYTES # BLD AUTO: 0.6 K/UL — SIGNIFICANT CHANGE UP (ref 0–0.8)
MONOCYTES NFR BLD AUTO: 5 % — SIGNIFICANT CHANGE UP (ref 3–10)
NEUTROPHILS # BLD AUTO: 1.9 K/UL — SIGNIFICANT CHANGE UP (ref 1.8–8)
NEUTROPHILS NFR BLD AUTO: 45 % — SIGNIFICANT CHANGE UP (ref 37–73)
NEUTS BAND # BLD: 1 % — SIGNIFICANT CHANGE UP (ref 0–8)
OVALOCYTES BLD QL SMEAR: SLIGHT — SIGNIFICANT CHANGE UP
PLAT MORPH BLD: NORMAL — SIGNIFICANT CHANGE UP
PLATELET # BLD AUTO: 254 K/UL — SIGNIFICANT CHANGE UP (ref 150–400)
POIKILOCYTOSIS BLD QL AUTO: SLIGHT — SIGNIFICANT CHANGE UP
POLYCHROMASIA BLD QL SMEAR: SLIGHT — SIGNIFICANT CHANGE UP
PROT SERPL-MCNC: 5.7 G/DL — LOW (ref 6.6–8.7)
RBC # BLD: 3.66 M/UL — LOW (ref 4.4–5.2)
RBC # FLD: 14.6 % — SIGNIFICANT CHANGE UP (ref 11–15.6)
RBC BLD AUTO: ABNORMAL
SPECIMEN SOURCE: SIGNIFICANT CHANGE UP
SPECIMEN SOURCE: SIGNIFICANT CHANGE UP
VARIANT LYMPHS # BLD: 5 % — SIGNIFICANT CHANGE UP (ref 0–6)
WBC # BLD: 7.6 K/UL — SIGNIFICANT CHANGE UP (ref 4.8–10.8)
WBC # FLD AUTO: 7.6 K/UL — SIGNIFICANT CHANGE UP (ref 4.8–10.8)

## 2017-10-23 PROCEDURE — 86376 MICROSOMAL ANTIBODY EACH: CPT

## 2017-10-23 PROCEDURE — 71250 CT THORAX DX C-: CPT

## 2017-10-23 PROCEDURE — 86140 C-REACTIVE PROTEIN: CPT

## 2017-10-23 PROCEDURE — 86664 EPSTEIN-BARR NUCLEAR ANTIGEN: CPT

## 2017-10-23 PROCEDURE — 85652 RBC SED RATE AUTOMATED: CPT

## 2017-10-23 PROCEDURE — 86480 TB TEST CELL IMMUN MEASURE: CPT

## 2017-10-23 PROCEDURE — 86038 ANTINUCLEAR ANTIBODIES: CPT

## 2017-10-23 PROCEDURE — 87486 CHLMYD PNEUM DNA AMP PROBE: CPT

## 2017-10-23 PROCEDURE — 99232 SBSQ HOSP IP/OBS MODERATE 35: CPT

## 2017-10-23 PROCEDURE — 70486 CT MAXILLOFACIAL W/O DYE: CPT

## 2017-10-23 PROCEDURE — 86780 TREPONEMA PALLIDUM: CPT

## 2017-10-23 PROCEDURE — 99285 EMERGENCY DEPT VISIT HI MDM: CPT | Mod: 25

## 2017-10-23 PROCEDURE — 86645 CMV ANTIBODY IGM: CPT

## 2017-10-23 PROCEDURE — 85027 COMPLETE CBC AUTOMATED: CPT

## 2017-10-23 PROCEDURE — 86665 EPSTEIN-BARR CAPSID VCA: CPT

## 2017-10-23 PROCEDURE — 83605 ASSAY OF LACTIC ACID: CPT

## 2017-10-23 PROCEDURE — 87581 M.PNEUMON DNA AMP PROBE: CPT

## 2017-10-23 PROCEDURE — 86431 RHEUMATOID FACTOR QUANT: CPT

## 2017-10-23 PROCEDURE — 96374 THER/PROPH/DIAG INJ IV PUSH: CPT

## 2017-10-23 PROCEDURE — 80076 HEPATIC FUNCTION PANEL: CPT

## 2017-10-23 PROCEDURE — 83036 HEMOGLOBIN GLYCOSYLATED A1C: CPT

## 2017-10-23 PROCEDURE — 86703 HIV-1/HIV-2 1 RESULT ANTBDY: CPT

## 2017-10-23 PROCEDURE — 86644 CMV ANTIBODY: CPT

## 2017-10-23 PROCEDURE — 87040 BLOOD CULTURE FOR BACTERIA: CPT

## 2017-10-23 PROCEDURE — T1013: CPT

## 2017-10-23 PROCEDURE — 81001 URINALYSIS AUTO W/SCOPE: CPT

## 2017-10-23 PROCEDURE — 87798 DETECT AGENT NOS DNA AMP: CPT

## 2017-10-23 PROCEDURE — 87633 RESP VIRUS 12-25 TARGETS: CPT

## 2017-10-23 PROCEDURE — 76856 US EXAM PELVIC COMPLETE: CPT

## 2017-10-23 PROCEDURE — 86255 FLUORESCENT ANTIBODY SCREEN: CPT

## 2017-10-23 PROCEDURE — 84145 PROCALCITONIN (PCT): CPT

## 2017-10-23 PROCEDURE — 85730 THROMBOPLASTIN TIME PARTIAL: CPT

## 2017-10-23 PROCEDURE — 80053 COMPREHEN METABOLIC PANEL: CPT

## 2017-10-23 PROCEDURE — 87207 SMEAR SPECIAL STAIN: CPT

## 2017-10-23 PROCEDURE — 96375 TX/PRO/DX INJ NEW DRUG ADDON: CPT

## 2017-10-23 PROCEDURE — 93005 ELECTROCARDIOGRAM TRACING: CPT

## 2017-10-23 PROCEDURE — 87086 URINE CULTURE/COLONY COUNT: CPT

## 2017-10-23 PROCEDURE — 86663 EPSTEIN-BARR ANTIBODY: CPT

## 2017-10-23 PROCEDURE — 85610 PROTHROMBIN TIME: CPT

## 2017-10-23 PROCEDURE — 80074 ACUTE HEPATITIS PANEL: CPT

## 2017-10-23 PROCEDURE — 86666 EHRLICHIA ANTIBODY: CPT

## 2017-10-23 PROCEDURE — 80202 ASSAY OF VANCOMYCIN: CPT

## 2017-10-23 PROCEDURE — 80048 BASIC METABOLIC PNL TOTAL CA: CPT

## 2017-10-23 PROCEDURE — 99239 HOSP IP/OBS DSCHRG MGMT >30: CPT

## 2017-10-23 PROCEDURE — 36415 COLL VENOUS BLD VENIPUNCTURE: CPT

## 2017-10-23 PROCEDURE — 86381 MITOCHONDRIAL ANTIBODY EACH: CPT

## 2017-10-23 PROCEDURE — 87799 DETECT AGENT NOS DNA QUANT: CPT

## 2017-10-23 PROCEDURE — 82962 GLUCOSE BLOOD TEST: CPT

## 2017-10-23 PROCEDURE — 74176 CT ABD & PELVIS W/O CONTRAST: CPT

## 2017-10-23 RX ORDER — ACETAMINOPHEN 500 MG
2 TABLET ORAL
Qty: 0 | Refills: 0 | COMMUNITY
Start: 2017-10-23

## 2017-10-23 NOTE — CONSULT NOTE ADULT - ASSESSMENT
56yo p/w GISELLA, h/o endometrial sampling at gyn's office
THIS 57 y.o. F here for fevers.   no clear source.    no toxic in appearance.   defer antibiotics

## 2017-10-23 NOTE — CONSULT NOTE ADULT - PROBLEM SELECTOR RECOMMENDATION 9
- Blood and Urine cultures negative to date  - Recommend continued Infectious Disease workup  - No evidence of foreign body on exam (i.e. pessary)  - CT scans and TVUS WNL  - Recommend f/u on pathology for endometrial sampling done at gyn's office  - No acute gyn onc intervention at this time  d/w Dr. Montalvo - Blood and Urine cultures negative to date  - Recommend continued Infectious Disease workup  - No evidence of foreign body on exam (i.e. pessary)  - CT scans and TVUS WNL  - Recommend f/u on pathology for endometrial sampling done at gyn's office, please reconsult if pathology malignant  - No acute gyn onc intervention at this time  d/w Dr. Montalvo

## 2017-10-23 NOTE — DISCHARGE NOTE ADULT - CARE PLAN
Principal Discharge DX:	Katelynn Barr virus infection  Goal:	resolution of fevers  Instructions for follow-up, activity and diet:	tylenol for fever  follow up with infectious disease in 1 week  Secondary Diagnosis:	Cytomegalic inclusion virus hepatitis  Instructions for follow-up, activity and diet:	as above

## 2017-10-23 NOTE — DISCHARGE NOTE ADULT - HOSPITAL COURSE
57F presented with recurrent fevers. CT of the sinus noted trace ethmoid sinus mucosal thickening, clear paranasal sinuses and mastoid air cells. CT of the chest, abdomen, and pelvis noted no pulmonary consolidation, hepatic cysts and subcentimeter hypodense hepatic lesions too small to characterize, no biliary ductal dilation, no bowel obstruction, inflammation, pneumoperitoneum, lymphadenopathy, or retroperitoneal hematoma. Empiric antibiotics were initiated for fever of unknown origin. The patient was seen by Infectious Disease in consultation and antibiotics were discontinued and the patient monitored clinically while awaiting test results. The patient was noted to have a had a recent dilatation and curettage and pelvic ultrasound was found to be unremarkable. The patient developed fever and hypotension overnight requiring intravenous fluids and resumption of antibiotics.  Over the last 72 hours, the patients fever curve is slowly improving with temps of 100.1-100.4 but all cultures have been negative.  Exam revealed patient to be in NAD, CTA bilaterally, no abdominal tenderness or peripheral edema or rash.  She was noted to have some elevation in LFTs so EBV/CMV titers were ordered and were found to both be positive for IgM.  Case was discussed with infectious disease, Dr. Keene, who recommended patient be discharged on tylenol and to follow up with him in 1 week.  Patient is now medically optimized for discharge with close outpatient follow up.  Case was discussed with patients daughter.  Patient advised to remain home from work until cleared to return by ID and to refrain from prolonged close contact with relatives until approved by ID.

## 2017-10-23 NOTE — DISCHARGE NOTE ADULT - MEDICATION SUMMARY - MEDICATIONS TO TAKE
I will START or STAY ON the medications listed below when I get home from the hospital:    acetaminophen 325 mg oral tablet  -- 2 tab(s) by mouth every 6 hours, As needed, For Temp greater than 38 C (100.4 F)  OTC  -- Indication: For Viral Fever

## 2017-10-23 NOTE — DISCHARGE NOTE ADULT - CARE PROVIDER_API CALL
Oscar Keene), Infectious Disease; Internal Medicine  [address] [place] [address]  Phone: [phone]  Fax: [phone]

## 2017-10-23 NOTE — CONSULT NOTE ADULT - SUBJECTIVE AND OBJECTIVE BOX
GYNECOLOGIC ONCOLOGY CONSULT NOTE    57y  LMP 20 years ago presents to the hospital with fever of unknown origin s/p an office endometrial sampling 2 weeks ago.  Pt denies any associated symptoms.  Denies abdominal pain, nausea, vomiting, diarrhea, or any urinary symptoms.  Denies CP/SOB.  Pt denies any h/o postmenopausal bleeding.  Was initially being evaluated by Dr. Geller for symptoms of pelvic organ prolapse.  Remainder of ROS WNL.    OB/GYN HISTORY:    x 1  LTCS x 1    Surgical History:    Delivery with history of        Past Medical History:   No pertinent past medical history      No Known Allergies      acetaminophen   Tablet 650 milliGRAM(s) Oral every 6 hours PRN  aluminum hydroxide/magnesium hydroxide/simethicone Suspension 30 milliLiter(s) Oral every 6 hours PRN  sodium chloride 0.9%. 1000 milliLiter(s) IV Continuous <Continuous>      FAMILY HISTORY:      Social History:     REVIEW OF SYSTEMS:    CONSTITUTIONAL: No weight loss, or fatigue  EYES: No eye pain, visual disturbances, or discharge  ENMT:  No difficulty hearing, tinnitus, vertigo; No sinus or throat pain  NECK: No pain or stiffness  BREASTS: No pain, masses, or nipple discharge  RESPIRATORY: No cough, wheezing, chills or hemoptysis; No shortness of breath  CARDIOVASCULAR: No chest pain, palpitations, dizziness, or leg swelling  GASTROINTESTINAL: No abdominal or epigastric pain. No nausea, vomiting, or hematemesis; No diarrhea or constipation. No melena or hematochezia.  GENITOURINARY: No dysuria, frequency, hematuria, or incontinence; no vaginal bleeding  NEUROLOGICAL: No headaches, memory loss, loss of strength, numbness, or tremors  SKIN: No itching, burning, rashes, or lesions   LYMPH NODES: No enlarged glands  ENDOCRINE: No heat or cold intolerance; No hair loss  MUSCULOSKELETAL: No joint pain or swelling; No muscle, back, or extremity pain  PSYCHIATRIC: No depression, anxiety, mood swings, or difficulty sleeping  HEME/LYMPH: No easy bruising, or bleeding gums  ALLERY AND IMMUNOLOGIC: No hives or eczema      MEDICATIONS  (STANDING):  sodium chloride 0.9%. 1000 milliLiter(s) (100 mL/Hr) IV Continuous <Continuous>    MEDICATIONS  (PRN):  acetaminophen   Tablet 650 milliGRAM(s) Oral every 6 hours PRN For Temp greater than 38 C (100.4 F)  aluminum hydroxide/magnesium hydroxide/simethicone Suspension 30 milliLiter(s) Oral every 6 hours PRN Dyspepsia      OBJECTIVE FINDINGS:    Vital Signs Last 24 Hrs  T(C): 37.2 (23 Oct 2017 09:49), Max: 37.7 (22 Oct 2017 17:13)  T(F): 99 (23 Oct 2017 09:49), Max: 99.8 (22 Oct 2017 17:13)  HR: 102 (23 Oct 2017 09:49) (99 - 109)  BP: 111/70 (23 Oct 2017 09:49) (109/62 - 111/72)  RR: 18 (23 Oct 2017 09:49) (18 - 18)  SpO2: 94% (23 Oct 2017 09:49) (93% - 96%)    PHYSICAL EXAM:    GENERAL: NAD, well-developed  HEAD:  Atraumatic, Normocephalic  EYES: EOMI, PERRLA, conjunctiva and sclera clear  ENMT: No tonsillar erythema, exudates, or enlargement; Moist mucous membranes, Good dentition, No lesions  NECK: Supple, No JVD, Normal thyroid  NERVOUS SYSTEM:  Alert & Oriented X3, Good concentration; Motor Strength 5/5 B/L upper and lower extremities; DTRs 2+ intact and symmetric  CHEST/LUNG: Clear to percussion bilaterally; No rales, rhonchi, wheezing, or rubs  HEART: Regular rate and rhythm; No murmurs, rubs, or gallops  ABDOMEN: Obese, Soft, Nontender, Nondistended; Bowel sounds present, No rebound, No guarding  EXTREMITIES:  2+ Peripheral Pulses, No clubbing, cyanosis, or edema, Catie's sign negative  LYMPH: No lymphadenopathy noted  SKIN: No rashes or lesions  PELVIC: No pessary palpated; narrow vaginal canal with small cervix; no CMT, no pelvic masses  RECTAL: Deferred      LABS:                        10.6   7.6   )-----------( 254      ( 23 Oct 2017 06:32 )             33.8         TPro  5.7<L>  /  Alb  2.4<L>  /  TBili  0.3<L>  /  DBili  0.1  /  AST  65<H>  /  ALT  53<H>  /  AlkPhos  147<H>  10-23          RADIOLOGY & ADDITIONAL STUDIES:

## 2017-10-23 NOTE — DISCHARGE NOTE ADULT - PATIENT PORTAL LINK FT
“You can access the FollowHealth Patient Portal, offered by [facility], by registering with the following website: [email or web address]”

## 2017-10-23 NOTE — PROGRESS NOTE ADULT - ASSESSMENT
THIS 57 y.o. F here for fevers.   no clear source.    no toxic in appearance.   defer antibiotics THIS 57 y.o. F here for fevers.  no clear source.   no toxic in appearance.  defer antibiotics.    CMV and EBV antibody of unclear clinical significant in absence of findings.    No vision changes noted.  Will check EBV and CMV viral loads. No specific treatment     Suggest outpatient autoimmune workup.

## 2017-10-24 LAB
LKM AB SER-ACNC: 1.1 UNITS — SIGNIFICANT CHANGE UP (ref 0–20)
MITOCHONDRIA AB SER-ACNC: SIGNIFICANT CHANGE UP
SMOOTH MUSCLE AB SER-ACNC: SIGNIFICANT CHANGE UP

## 2017-10-25 LAB
CMV DNA CSF QL NAA+PROBE: 110 — SIGNIFICANT CHANGE UP
CMV DNA SPEC NAA+PROBE-LOG#: 2.04 LOGIU/ML — HIGH
CULTURE RESULTS: SIGNIFICANT CHANGE UP
CULTURE RESULTS: SIGNIFICANT CHANGE UP
EBV DNA SERPL NAA+PROBE-ACNC: ABNORMAL IU/ML
EBVPCR LOG: ABNORMAL LOGIU/ML
SPECIMEN SOURCE: SIGNIFICANT CHANGE UP
SPECIMEN SOURCE: SIGNIFICANT CHANGE UP

## 2017-10-30 ENCOUNTER — APPOINTMENT (OUTPATIENT)
Dept: INTERNAL MEDICINE | Facility: CLINIC | Age: 57
End: 2017-10-30
Payer: MEDICAID

## 2017-10-30 PROCEDURE — 99213 OFFICE O/P EST LOW 20 MIN: CPT

## 2017-12-20 NOTE — ED ADULT TRIAGE NOTE - BP NONINVASIVE SYSTOLIC (MM HG)
Patient called twice in 10 minutes, insisting on talking to Maria Fernanda Coon, or her RN. I told her we do not take calls for Maria Fernanda Coon, and gave her the number to call. She then called back, insisting I do something about this, as no one is at the number I gave her. I told her she would have to call that number back to get a message to Maria Fernanda Coon. She told me no, I needed to get her to Maria Fernanda Coon or her nurse. I once, again told her that she would need to call the number I gave her. She told me my customer service sucks. While trying to get an encounter in for Mami CAO to take the call, she hung up. I am hoping she got thru to someone at [facility].  Maryjane Le  [facility]     132

## 2017-12-29 ENCOUNTER — APPOINTMENT (OUTPATIENT)
Dept: INTERNAL MEDICINE | Facility: CLINIC | Age: 57
End: 2017-12-29
Payer: MEDICAID

## 2017-12-29 PROCEDURE — 99214 OFFICE O/P EST MOD 30 MIN: CPT

## 2018-01-16 ENCOUNTER — APPOINTMENT (OUTPATIENT)
Dept: INTERNAL MEDICINE | Facility: CLINIC | Age: 58
End: 2018-01-16
Payer: MEDICAID

## 2018-01-16 PROCEDURE — 99214 OFFICE O/P EST MOD 30 MIN: CPT | Mod: 25

## 2018-01-16 PROCEDURE — 36415 COLL VENOUS BLD VENIPUNCTURE: CPT

## 2018-09-26 NOTE — ED ADULT TRIAGE NOTE - ESI TRIAGE ACUITY LEVEL, MLM
"Chief Complaint   Patient presents with     Contraception     Breathing Problem       Initial /72  Pulse 110  Temp 97.9  F (36.6  C)  Ht 5' 3\" (1.6 m)  Wt 132 lb (59.9 kg)  LMP 09/16/2018  SpO2 97%  BMI 23.38 kg/m2 Estimated body mass index is 23.38 kg/(m^2) as calculated from the following:    Height as of this encounter: 5' 3\" (1.6 m).    Weight as of this encounter: 132 lb (59.9 kg).  Medication Reconciliation: complete    Jerardo Ralph LPN    "
3

## 2019-11-29 ENCOUNTER — EMERGENCY (EMERGENCY)
Facility: HOSPITAL | Age: 59
LOS: 1 days | Discharge: HOME | End: 2019-11-29
Attending: EMERGENCY MEDICINE
Payer: COMMERCIAL

## 2019-11-29 VITALS
RESPIRATION RATE: 20 BRPM | HEART RATE: 77 BPM | SYSTOLIC BLOOD PRESSURE: 116 MMHG | HEIGHT: 64 IN | OXYGEN SATURATION: 98 % | DIASTOLIC BLOOD PRESSURE: 69 MMHG | WEIGHT: 164.91 LBS | TEMPERATURE: 98 F

## 2019-11-29 DIAGNOSIS — O34.219 MATERNAL CARE FOR UNSPECIFIED TYPE SCAR FROM PREVIOUS CESAREAN DELIVERY: Chronic | ICD-10-CM

## 2019-11-29 LAB
ALBUMIN SERPL ELPH-MCNC: 4.1 G/DL — SIGNIFICANT CHANGE UP (ref 3.3–5.2)
ALP SERPL-CCNC: 89 U/L — SIGNIFICANT CHANGE UP (ref 40–120)
ALT FLD-CCNC: 19 U/L — SIGNIFICANT CHANGE UP
ANION GAP SERPL CALC-SCNC: 11 MMOL/L — SIGNIFICANT CHANGE UP (ref 5–17)
AST SERPL-CCNC: 20 U/L — SIGNIFICANT CHANGE UP
BASOPHILS # BLD AUTO: 0.02 K/UL — SIGNIFICANT CHANGE UP (ref 0–0.2)
BASOPHILS NFR BLD AUTO: 0.3 % — SIGNIFICANT CHANGE UP (ref 0–2)
BILIRUB SERPL-MCNC: 0.3 MG/DL — LOW (ref 0.4–2)
BUN SERPL-MCNC: 17 MG/DL — SIGNIFICANT CHANGE UP (ref 8–20)
CALCIUM SERPL-MCNC: 8.8 MG/DL — SIGNIFICANT CHANGE UP (ref 8.6–10.2)
CHLORIDE SERPL-SCNC: 102 MMOL/L — SIGNIFICANT CHANGE UP (ref 98–107)
CO2 SERPL-SCNC: 26 MMOL/L — SIGNIFICANT CHANGE UP (ref 22–29)
CREAT SERPL-MCNC: 0.83 MG/DL — SIGNIFICANT CHANGE UP (ref 0.5–1.3)
EOSINOPHIL # BLD AUTO: 0.19 K/UL — SIGNIFICANT CHANGE UP (ref 0–0.5)
EOSINOPHIL NFR BLD AUTO: 2.4 % — SIGNIFICANT CHANGE UP (ref 0–6)
GLUCOSE SERPL-MCNC: 116 MG/DL — HIGH (ref 70–115)
HCT VFR BLD CALC: 43 % — SIGNIFICANT CHANGE UP (ref 34.5–45)
HGB BLD-MCNC: 13.6 G/DL — SIGNIFICANT CHANGE UP (ref 11.5–15.5)
IMM GRANULOCYTES NFR BLD AUTO: 0.3 % — SIGNIFICANT CHANGE UP (ref 0–1.5)
LYMPHOCYTES # BLD AUTO: 2.17 K/UL — SIGNIFICANT CHANGE UP (ref 1–3.3)
LYMPHOCYTES # BLD AUTO: 27.6 % — SIGNIFICANT CHANGE UP (ref 13–44)
MCHC RBC-ENTMCNC: 30.5 PG — SIGNIFICANT CHANGE UP (ref 27–34)
MCHC RBC-ENTMCNC: 31.6 GM/DL — LOW (ref 32–36)
MCV RBC AUTO: 96.4 FL — SIGNIFICANT CHANGE UP (ref 80–100)
MONOCYTES # BLD AUTO: 0.52 K/UL — SIGNIFICANT CHANGE UP (ref 0–0.9)
MONOCYTES NFR BLD AUTO: 6.6 % — SIGNIFICANT CHANGE UP (ref 2–14)
NEUTROPHILS # BLD AUTO: 4.95 K/UL — SIGNIFICANT CHANGE UP (ref 1.8–7.4)
NEUTROPHILS NFR BLD AUTO: 62.8 % — SIGNIFICANT CHANGE UP (ref 43–77)
PLATELET # BLD AUTO: 209 K/UL — SIGNIFICANT CHANGE UP (ref 150–400)
POTASSIUM SERPL-MCNC: 4.1 MMOL/L — SIGNIFICANT CHANGE UP (ref 3.5–5.3)
POTASSIUM SERPL-SCNC: 4.1 MMOL/L — SIGNIFICANT CHANGE UP (ref 3.5–5.3)
PROT SERPL-MCNC: 7.3 G/DL — SIGNIFICANT CHANGE UP (ref 6.6–8.7)
RBC # BLD: 4.46 M/UL — SIGNIFICANT CHANGE UP (ref 3.8–5.2)
RBC # FLD: 12.7 % — SIGNIFICANT CHANGE UP (ref 10.3–14.5)
SODIUM SERPL-SCNC: 139 MMOL/L — SIGNIFICANT CHANGE UP (ref 135–145)
WBC # BLD: 7.87 K/UL — SIGNIFICANT CHANGE UP (ref 3.8–10.5)
WBC # FLD AUTO: 7.87 K/UL — SIGNIFICANT CHANGE UP (ref 3.8–10.5)

## 2019-11-29 PROCEDURE — 80053 COMPREHEN METABOLIC PANEL: CPT

## 2019-11-29 PROCEDURE — 96374 THER/PROPH/DIAG INJ IV PUSH: CPT

## 2019-11-29 PROCEDURE — T1013: CPT

## 2019-11-29 PROCEDURE — 96375 TX/PRO/DX INJ NEW DRUG ADDON: CPT

## 2019-11-29 PROCEDURE — 99284 EMERGENCY DEPT VISIT MOD MDM: CPT

## 2019-11-29 PROCEDURE — 85027 COMPLETE CBC AUTOMATED: CPT

## 2019-11-29 PROCEDURE — 36415 COLL VENOUS BLD VENIPUNCTURE: CPT

## 2019-11-29 PROCEDURE — 99284 EMERGENCY DEPT VISIT MOD MDM: CPT | Mod: 25

## 2019-11-29 RX ORDER — KETOROLAC TROMETHAMINE 30 MG/ML
30 SYRINGE (ML) INJECTION ONCE
Refills: 0 | Status: DISCONTINUED | OUTPATIENT
Start: 2019-11-29 | End: 2019-11-29

## 2019-11-29 RX ADMIN — Medication 110 MILLIGRAM(S): at 16:12

## 2019-11-29 RX ADMIN — Medication 30 MILLIGRAM(S): at 16:12

## 2019-11-29 NOTE — ED PROVIDER NOTE - OBJECTIVE STATEMENT
59 year old female with no significant PMh present with cellulitis. Pt reports 2 days of LLE redness and throbbing pain, No fevers, chills, numbness, tinglign, weakness, discharge.

## 2019-11-29 NOTE — ED PROVIDER NOTE - SKIN, MLM
Skin normal color for race, warm, dry and intact. erythema, warmth, induration to the medial aspect of the L lower leg, no fluctuance, no drainage, no crepitus, no circumferential induration

## 2019-11-29 NOTE — ED PROVIDER NOTE - PATIENT PORTAL LINK FT
You can access the FollowMyHealth Patient Portal offered by [facility] by registering at the following website: [email or web address]. By joining [email or web address]’s FollowMyHealth portal, you will also be able to view your health information using other applications (apps) compatible with our system.

## 2020-06-13 NOTE — PROGRESS NOTE ADULT - SUBJECTIVE AND OBJECTIVE BOX
CC: Patient c/o some fevers last night, but has felt better today.  denied chills or diaphoresis    HPI:  Pt is a 58 yo female with pmh of pre diabetes presenting with fevers. Pt states that 2-3 weeks ago she had a D and C. Then 2 weeks ago she noticed having fevers on a daily basis. Fever is as high as 102. Pt has been taking advil twicw a day. No abd pain, no cough, no vomiting, no dysuria, no vaginal discharge, no pelvic pain, no lower extremity pain/swelling.   Pt recently presented to ED with rash thought to be shingles. Pt followed up with dermatology and diagnosed instead with poison ivy rash which has now resolved.     In ED, fever 101.4.Cultures sent. (19 Oct 2017 02:32)    REVIEW OF SYSTEMS:    Patient denied fever, chills, abdominal pain, nausea, vomiting, cough, shortness of breath, chest pain or palpitations    Vital Signs Last 24 Hrs  T(C): 37.1 (22 Oct 2017 07:43), Max: 38.1 (21 Oct 2017 20:45)  T(F): 98.7 (22 Oct 2017 07:43), Max: 100.6 (21 Oct 2017 20:45)  HR: 101 (22 Oct 2017 07:43) (92 - 102)  BP: 108/68 (22 Oct 2017 07:43) (101/61 - 120/65)  BP(mean): --  RR: 20 (22 Oct 2017 07:43) (18 - 20)  SpO2: 97% (22 Oct 2017 06:32) (97% - 98%)I&O's Summary    21 Oct 2017 07:01  -  22 Oct 2017 07:00  --------------------------------------------------------  IN: 2300 mL / OUT: 0 mL / NET: 2300 mL    CAPILLARY BLOOD GLUCOSE    PHYSICAL EXAM:  GENERAL: NAD, well-groomed  HEENT: PERRL, +EOMI, anicteric, no [place]  NECK: Supple, No JVD   CHEST/LUNG: CTA bilaterally; Normal effort  HEART: S1S2 Normal intensity, no murmurs, gallops or rubs noted  ABDOMEN: Soft, BS Normoactive, NT, ND, no HSM noted  EXTREMITIES:  2+ radial and DP pulses noted, no clubbing, cyanosis, or edema noted, FROM x 4  SKIN: No rashes or lesions noted  NEURO: A&Ox3, no focal deficits noted, CN II-XII intact  PSYCH: normal mood and affect; insight/judgement appropriate    LABS:    10-21    138  |  103  |  7.0<L>  ----------------------------<  103  3.7   |  24.0  |  0.58    Ca    7.7<L>      21 Oct 2017 07:50    TPro  6.2<L>  /  Alb  2.6<L>  /  TBili  0.3<L>  /  DBili  0.1  /  AST  90<H>  /  ALT  64<H>  /  AlkPhos  142<H>  10-22        RADIOLOGY & ADDITIONAL TESTS:    MEDICATIONS:  MEDICATIONS  (STANDING):  sodium chloride 0.9%. 1000 milliLiter(s) (100 mL/Hr) IV Continuous <Continuous>    MEDICATIONS  (PRN):  acetaminophen   Tablet 650 milliGRAM(s) Oral every 6 hours PRN For Temp greater than 38 C (100.4 F)  aluminum hydroxide/magnesium hydroxide/simethicone Suspension 30 milliLiter(s) Oral every 6 hours PRN Dyspepsia Endocrinology Internal Medicine [facility] Nephrology Pulmonology

## 2021-04-28 ENCOUNTER — APPOINTMENT (OUTPATIENT)
Dept: PULMONOLOGY | Facility: CLINIC | Age: 61
End: 2021-04-28
Payer: MEDICAID

## 2021-04-28 VITALS
HEIGHT: 61 IN | DIASTOLIC BLOOD PRESSURE: 70 MMHG | BODY MASS INDEX: 31.34 KG/M2 | WEIGHT: 166 LBS | SYSTOLIC BLOOD PRESSURE: 120 MMHG

## 2021-04-28 VITALS — OXYGEN SATURATION: 95 % | RESPIRATION RATE: 16 BRPM | HEART RATE: 74 BPM

## 2021-04-28 DIAGNOSIS — Z84.89 FAMILY HISTORY OF OTHER SPECIFIED CONDITIONS: ICD-10-CM

## 2021-04-28 DIAGNOSIS — Z83.3 FAMILY HISTORY OF DIABETES MELLITUS: ICD-10-CM

## 2021-04-28 DIAGNOSIS — Z85.01 PERSONAL HISTORY OF MALIGNANT NEOPLASM OF ESOPHAGUS: ICD-10-CM

## 2021-04-28 PROCEDURE — 99072 ADDL SUPL MATRL&STAF TM PHE: CPT

## 2021-04-28 PROCEDURE — 99204 OFFICE O/P NEW MOD 45 MIN: CPT

## 2021-04-28 NOTE — CONSULT LETTER
[Dear  ___] : Dear ~GURPREET, [Consult Letter:] : I had the pleasure of evaluating your patient, [unfilled]. [Please see my note below.] : Please see my note below. [Consult Closing:] : Thank you very much for allowing me to participate in the care of this patient.  If you have any questions, please do not hesitate to contact me. [Sincerely,] : Sincerely, [FreeTextEntry3] : Jarrett Rosa MD\par  [DrRavi  ___] : Dr. LOYOLA

## 2021-05-22 ENCOUNTER — APPOINTMENT (OUTPATIENT)
Dept: DISASTER EMERGENCY | Facility: CLINIC | Age: 61
End: 2021-05-22

## 2021-06-20 ENCOUNTER — APPOINTMENT (OUTPATIENT)
Dept: DISASTER EMERGENCY | Facility: CLINIC | Age: 61
End: 2021-06-20

## 2021-06-20 ENCOUNTER — LABORATORY RESULT (OUTPATIENT)
Age: 61
End: 2021-06-20

## 2021-06-23 ENCOUNTER — APPOINTMENT (OUTPATIENT)
Dept: PULMONOLOGY | Facility: CLINIC | Age: 61
End: 2021-06-23

## 2021-06-23 ENCOUNTER — APPOINTMENT (OUTPATIENT)
Dept: PULMONOLOGY | Facility: CLINIC | Age: 61
End: 2021-06-23
Payer: MEDICAID

## 2021-06-23 VITALS — OXYGEN SATURATION: 96 % | SYSTOLIC BLOOD PRESSURE: 110 MMHG | HEART RATE: 67 BPM | DIASTOLIC BLOOD PRESSURE: 70 MMHG

## 2021-06-23 VITALS — HEIGHT: 62 IN | WEIGHT: 168 LBS | BODY MASS INDEX: 30.91 KG/M2 | TEMPERATURE: 98 F

## 2021-06-23 PROCEDURE — 99213 OFFICE O/P EST LOW 20 MIN: CPT | Mod: 25

## 2021-06-23 PROCEDURE — 99072 ADDL SUPL MATRL&STAF TM PHE: CPT

## 2021-06-23 PROCEDURE — 94727 GAS DIL/WSHOT DETER LNG VOL: CPT

## 2021-06-23 PROCEDURE — 94729 DIFFUSING CAPACITY: CPT

## 2021-06-23 PROCEDURE — 85018 HEMOGLOBIN: CPT | Mod: QW

## 2021-06-23 PROCEDURE — 94010 BREATHING CAPACITY TEST: CPT

## 2021-06-23 RX ORDER — OMEPRAZOLE 20 MG/1
20 TABLET, DELAYED RELEASE ORAL
Refills: 0 | Status: ACTIVE | COMMUNITY

## 2021-06-23 RX ORDER — SIMVASTATIN 20 MG/1
20 TABLET, FILM COATED ORAL
Refills: 0 | Status: ACTIVE | COMMUNITY

## 2021-06-23 RX ORDER — OMEPRAZOLE MAGNESIUM 40 MG/1
CAPSULE, DELAYED RELEASE ORAL
Refills: 0 | Status: DISCONTINUED | COMMUNITY
End: 2021-06-23

## 2021-07-09 ENCOUNTER — EMERGENCY (EMERGENCY)
Facility: HOSPITAL | Age: 61
LOS: 1 days | Discharge: HOME | End: 2021-07-09
Attending: EMERGENCY MEDICINE
Payer: COMMERCIAL

## 2021-07-09 VITALS
HEART RATE: 78 BPM | SYSTOLIC BLOOD PRESSURE: 136 MMHG | HEIGHT: 64 IN | RESPIRATION RATE: 18 BRPM | TEMPERATURE: 99 F | WEIGHT: 172.62 LBS | OXYGEN SATURATION: 96 % | DIASTOLIC BLOOD PRESSURE: 68 MMHG

## 2021-07-09 DIAGNOSIS — O34.219 MATERNAL CARE FOR UNSPECIFIED TYPE SCAR FROM PREVIOUS CESAREAN DELIVERY: Chronic | ICD-10-CM

## 2021-07-09 PROCEDURE — 99283 EMERGENCY DEPT VISIT LOW MDM: CPT | Mod: 25

## 2021-07-09 PROCEDURE — 16000 INITIAL TREATMENT OF BURN(S): CPT

## 2021-07-09 RX ORDER — ACETAMINOPHEN 500 MG
975 TABLET ORAL ONCE
Refills: 0 | Status: COMPLETED | OUTPATIENT
Start: 2021-07-09 | End: 2021-07-09
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
## 2021-07-09 NOTE — ED PROVIDER NOTE - NSFOLLOWUPINSTRUCTIONS_ED_ALL_ED_FT
Burn    A burn is an injury to your skin or the tissues under your skin usually caused by heat or caustic chemicals. In severe cases, a burn can damage the muscles and bones under the skin. There are three different degrees of burns: first (mild), second, and third (severe). Make sure to use any prescribed ointments as directed. If you were prescribed antibiotic medicine, take it as told by your health care provider. Do not stop using the antibiotic even if your condition improves. Follow up is available at the burn clinic.    Place Bacitracin over the wound twice a day. Use a non-adhesive dressing to cover the wound. Take Tylenol as needed for pain, Percocet as needed for severe pain.     SEEK IMMEDIATE MEDICAL CARE IF YOU HAVE ANY OF THE FOLLOWING SYMPTOMS: red streaks near the burn, severe pain, or fever.

## 2021-07-09 NOTE — ED ADULT TRIAGE NOTE - HEART RATE (BEATS/MIN)
78
Principal Discharge DX:	Atypical pneumonia  Assessment and plan of treatment:	Follow-up with your primary care provider within 2-3 days.  Take Azithromycin as prescribed, you have been given one dose today in the ER, begin taking this medication daily starting tomorrow.  For cold symptoms you may take ceterizine (Zyrtec) over the counter, take one pill daily as directed for symptoms. Additionally you may take Flonase (fluticasone propionate) over the counter as directed, 2 sprays in each nostril for 1 week, then 1 spray each nostril daily.  Pain can be managed with Acetaminophen (aka Tylenol) 650mg (2 regular strength tablets) every 6 hours and/or ibuprofen (aka Motrin or Advil)  400mg (2 regular strength tablets) every 6-8hours.  Continue to take all other medications as directed.  Return to the emergency room immediately if your symptoms worsen or persist, or if you have fever despite antibiotics or use of tylenol and ibuprofen, headache, chest pain, back pain, palpitations, vomiting, loss of consciousness (passing out) or if any other concerning or questionable symptoms.  Secondary Diagnosis:	URI (upper respiratory infection)

## 2021-07-09 NOTE — ED PROVIDER NOTE - CROS ED ROS STATEMENT
The patient presents with tender med/lat rt  for the past several weeks ROS:  General: No fever or sig wt change  HEENT:No other PND eye pain or dc  Respiratory: No cough wheezing  PE: vital signs noted  HEENT: Normocephalic,with no recent trauma,PERRLA,EOMI,conjunctiva injected with no exudate.Nasopharynx is injected and edematous.External otic canal edematous and injected TM dull  Neck:Supple without adenopathy  Chest:Clear bilateral breath sounds Heart:Regular rhthym without murmer  Abdomen:Soft, non tender,no masses, no hepatosplenomegaly  Extremeties:Twnder med/lateral rt leg      Impression:Knee injury     Plan:  Ortho con  RF meds     
all other ROS negative except as per HPI

## 2021-07-09 NOTE — ED PROVIDER NOTE - PATIENT PORTAL LINK FT
You can access the FollowMyHealth Patient Portal offered by [facility] by registering at the following website: [email or web address]. By joining [employer]’s FollowMyHealth portal, you will also be able to view your health information using other applications (apps) compatible with our system.
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
## 2021-07-09 NOTE — ED PROVIDER NOTE - PHYSICAL EXAMINATION
Const: Awake, alert and oriented. In no acute distress. Well appearing.  Eyes: No scleral icterus. EOMI.  Cardiac: Regular rate and regular rhythm.   Resp: Speaking in full sentences. No evidence of respiratory distress. No wheezes, rales or rhonchi.  Back: Spine midline and non-tender. No CVAT.  Skin: Second degree burn noted to right hand. Blistering seen on exam. Ketchup covering the burns.   Neuro: Awake, alert & oriented x 3. Moves all extremities symmetrically. Const: Awake, alert and oriented. In no acute distress. Well appearing.  Eyes: No scleral icterus. EOMI.  Cardiac: Regular rate and regular rhythm.   Resp: Speaking in full sentences. No evidence of respiratory distress. No wheezes, rales or rhonchi.  Back: Spine midline and non-tender. No CVAT.  Skin: First degree burn noted to right hand. Limited extension of burn to fingers. FROM all finger joints.   Neuro: Awake, alert & oriented x 3. Moves all extremities symmetrically.

## 2021-07-09 NOTE — ED PROVIDER NOTE - NSFOLLOWUPCLINICS_GEN_ALL_ED_FT
[facility] [place]  [facility]  [address] [place] [address]  Phone: [phone]  Fax: [phone]  Follow Up Time: Urgent    [facility] [id number] [place] [address]  Phone: [phone]  Fax:   Follow Up Time: Urgent

## 2021-07-10 PROCEDURE — 99283 EMERGENCY DEPT VISIT LOW MDM: CPT

## 2021-07-10 RX ADMIN — Medication 975 MILLIGRAM(S): at 00:11
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
## 2021-10-07 NOTE — CONSULT LETTER
[Dear  ___] : Dear  [unfilled], [Courtesy Letter:] : I had the pleasure of seeing your patient, [unfilled], in my office today. [Please see my note below.] : Please see my note below. [Consult Closing:] : Thank you very much for allowing me to participate in the care of this patient.  If you have any questions, please do not hesitate to contact me. [Sincerely,] : Sincerely, [FreeTextEntry3] : Jarrett Rosa MD\par

## 2021-10-08 ENCOUNTER — APPOINTMENT (OUTPATIENT)
Dept: PULMONOLOGY | Facility: CLINIC | Age: 61
End: 2021-10-08

## 2021-10-20 ENCOUNTER — APPOINTMENT (OUTPATIENT)
Dept: PULMONOLOGY | Facility: CLINIC | Age: 61
End: 2021-10-20
Payer: MEDICAID

## 2021-10-20 VITALS
SYSTOLIC BLOOD PRESSURE: 110 MMHG | DIASTOLIC BLOOD PRESSURE: 70 MMHG | WEIGHT: 174 LBS | RESPIRATION RATE: 16 BRPM | HEART RATE: 78 BPM | BODY MASS INDEX: 31.83 KG/M2 | OXYGEN SATURATION: 97 %

## 2021-10-20 PROCEDURE — 99214 OFFICE O/P EST MOD 30 MIN: CPT

## 2021-10-20 PROCEDURE — 99072 ADDL SUPL MATRL&STAF TM PHE: CPT

## 2022-01-22 ENCOUNTER — APPOINTMENT (OUTPATIENT)
Age: 62
End: 2022-01-22
Payer: MEDICAID

## 2022-01-22 ENCOUNTER — OUTPATIENT (OUTPATIENT)
Dept: OUTPATIENT SERVICES | Facility: HOSPITAL | Age: 62
LOS: 1 days | End: 2022-01-22
Payer: COMMERCIAL

## 2022-01-22 DIAGNOSIS — G47.33 OBSTRUCTIVE SLEEP APNEA (ADULT) (PEDIATRIC): ICD-10-CM

## 2022-01-22 DIAGNOSIS — O34.219 MATERNAL CARE FOR UNSPECIFIED TYPE SCAR FROM PREVIOUS CESAREAN DELIVERY: Chronic | ICD-10-CM

## 2022-01-22 PROCEDURE — 95810 POLYSOM 6/> YRS 4/> PARAM: CPT | Mod: 26

## 2022-01-22 PROCEDURE — 95810 POLYSOM 6/> YRS 4/> PARAM: CPT

## 2022-01-28 ENCOUNTER — APPOINTMENT (OUTPATIENT)
Dept: ORTHOPEDIC SURGERY | Facility: CLINIC | Age: 62
End: 2022-01-28
Payer: MEDICAID

## 2022-01-28 VITALS
HEIGHT: 64 IN | DIASTOLIC BLOOD PRESSURE: 60 MMHG | BODY MASS INDEX: 28.68 KG/M2 | HEART RATE: 67 BPM | WEIGHT: 168 LBS | SYSTOLIC BLOOD PRESSURE: 129 MMHG

## 2022-01-28 DIAGNOSIS — M60.9 MYOSITIS, UNSPECIFIED: ICD-10-CM

## 2022-01-28 DIAGNOSIS — Z78.9 OTHER SPECIFIED HEALTH STATUS: ICD-10-CM

## 2022-01-28 DIAGNOSIS — M47.816 SPONDYLOSIS W/OUT MYELOPATHY OR RADICULOPATHY, LUMBAR REGION: ICD-10-CM

## 2022-01-28 PROCEDURE — 72100 X-RAY EXAM L-S SPINE 2/3 VWS: CPT

## 2022-01-28 PROCEDURE — 99203 OFFICE O/P NEW LOW 30 MIN: CPT

## 2022-01-28 PROCEDURE — 99072 ADDL SUPL MATRL&STAF TM PHE: CPT

## 2022-01-28 NOTE — HISTORY OF PRESENT ILLNESS
[de-identified] : 62 year old F presents for an initial evaluation of lower back pain that restricts movement. She states the pain has been present since Saturday. She is using topical modalities with relief. She states this happens often and began a few years ago. Pain causes her to be bed ridden. No PT at this time. She has been taking Advil, ibuprofen, and Tylenol. \par \par A formal [facility]  was used.  [Ataxia] : no ataxia [Incontinence] : no incontinence [Loss of Dexterity] : good dexterity [Urinary Ret.] : no urinary retention

## 2022-01-28 NOTE — DISCUSSION/SUMMARY
[de-identified] : We talked about the nature of the condition and treatment options. Anticipatory guidance regarding mechanically oriented lower back pain was given. Patient has been referred to physical therapy for decreased pain modalities, stretching and strengthening modalities, soft tissue modalities, and physical modalities. I will provide a prescription for Medrol dose pack for pain relief, patient was educated on the antiinflammatory properties of this medication and how this will help their pain. The patient will follow up in 4 - 6 weeks for a repeat clinical assessment. \par \par Prior to appointment and during encounter with patient extensive medical records were reviewed including but not limited to, hospital records, out patient records, imaging results, and lab data. During this appointment the patient was examined, diagnoses were discussed and explained in a face to face manner. In addition extensive time was spent reviewing aforementioned diagnostic studies. Counseling including abnormal image results, differential diagnoses, treatment options, risk and benefits, lifestyle changes, current condition, and current medications was performed. Patient's comments, questions, and concerns were address and patient verbalized understanding. Based on this patient's presentation at our office, which is an orthopedic spine surgeon's office, this patient inherently / intrinsically has a risk, however minute, of developing  issues such as Cauda equina syndrome, bowel and bladder changes, or progression of motor or neurological deficits such as paralysis which may be permanent.

## 2022-01-28 NOTE — ADDENDUM
[FreeTextEntry1] : Documented by Eugenio Ballesteros acting as a scribe for Dr. Brayden Cutler on 01/28/2022. All medical record entries made by the Scribe were at my, Dr. Brayden Cutler, direction and personally dictated by me on 01/28/2022 . I have reviewed the chart and agree that the record accurately reflects my personal performance of the history, physical exam, assessment and plan. I have also personally directed, reviewed, and agreed with the chart.

## 2022-01-28 NOTE — PHYSICAL EXAM
[Poor Appearance] : well-appearing [Acute Distress] : not in acute distress [Obese] : not obese [de-identified] : CONSTITUTIONAL: The patient is a very pleasant individual who is well-nourished and who appears stated age.\par PSYCHIATRIC: The patient is alert and oriented X 3 and in no apparent distress, and participates with orthopedic evaluation well.\par HEAD: Atraumatic and is nonsyndromic in appearance.\par EENT: No visible thyromegaly, EOMI.\par RESPIRATORY: Respiratory rate is regular, not dyspneic on examination.\par LYMPHATICS: There is no inguinal lymphadenopathy\par INTEGUMENTARY: Skin is clean, dry, and intact about the bilateral lower extremities and lumbar spine.\par VASCULAR: There is brisk capillary refill about the bilateral lower extremities.\par NEUROLOGIC: There are no pathologic reflexes. There is no decrease in sensation of the bilateral lower extremities on Wartenberg pinwheel examination. Deep tendon reflexes are well maintained at 2+/4 of the bilateral lower extremities and are symmetric.\par MUSCULOSKELETAL: There is no visible muscular atrophy. Manual motor strength is well maintained in the bilateral lower extremities. Signs and symptoms of lumbar muscle spasm and myositis. Negative tension sign and straight leg raise bilaterally. Quad extension, ankle dorsiflexion, EHL, plantar flexion, and ankle eversion are well preserved. Normal secondary orthopaedic exam of bilateral hips, greater trochanteric area, knees and ankles. No pain with internal or external rotation of the bilateral hips.  [de-identified] : Xray of the lumbar spine taken 01/28/2022 demonstrates mild age appropriate lumbar spondylosis.

## 2022-03-04 ENCOUNTER — APPOINTMENT (OUTPATIENT)
Dept: ORTHOPEDIC SURGERY | Facility: CLINIC | Age: 62
End: 2022-03-04

## 2022-03-24 ENCOUNTER — RX RENEWAL (OUTPATIENT)
Age: 62
End: 2022-03-24

## 2022-03-28 ENCOUNTER — APPOINTMENT (OUTPATIENT)
Dept: PULMONOLOGY | Facility: CLINIC | Age: 62
End: 2022-03-28
Payer: MEDICAID

## 2022-03-28 VITALS
RESPIRATION RATE: 16 BRPM | HEART RATE: 69 BPM | DIASTOLIC BLOOD PRESSURE: 72 MMHG | HEIGHT: 63 IN | SYSTOLIC BLOOD PRESSURE: 124 MMHG | WEIGHT: 170 LBS | BODY MASS INDEX: 30.12 KG/M2 | OXYGEN SATURATION: 98 %

## 2022-03-28 DIAGNOSIS — Z87.898 PERSONAL HISTORY OF OTHER SPECIFIED CONDITIONS: ICD-10-CM

## 2022-03-28 PROCEDURE — 99072 ADDL SUPL MATRL&STAF TM PHE: CPT

## 2022-03-28 PROCEDURE — 99214 OFFICE O/P EST MOD 30 MIN: CPT

## 2022-03-28 RX ORDER — GABAPENTIN 100 MG/1
100 CAPSULE ORAL
Qty: 30 | Refills: 0 | Status: DISCONTINUED | COMMUNITY
Start: 2022-01-21

## 2022-03-28 RX ORDER — OMEPRAZOLE 20 MG/1
20 CAPSULE, DELAYED RELEASE ORAL
Qty: 30 | Refills: 0 | Status: DISCONTINUED | COMMUNITY
Start: 2022-03-14 | End: 2022-03-28

## 2022-03-28 RX ORDER — NAPROXEN 500 MG/1
500 TABLET ORAL
Qty: 60 | Refills: 1 | Status: DISCONTINUED | COMMUNITY
Start: 2022-01-28 | End: 2022-03-28

## 2022-03-28 RX ORDER — UMECLIDINIUM 62.5 UG/1
62.5 AEROSOL, POWDER ORAL
Refills: 0 | Status: DISCONTINUED | COMMUNITY
End: 2022-03-28

## 2022-03-28 RX ORDER — METHYLPREDNISOLONE 4 MG/1
4 TABLET ORAL
Qty: 1 | Refills: 0 | Status: DISCONTINUED | COMMUNITY
Start: 2022-01-28 | End: 2022-03-28

## 2022-07-07 ENCOUNTER — RX ONLY (RX ONLY)
Age: 62
End: 2022-07-07

## 2022-07-07 ENCOUNTER — OFFICE (OUTPATIENT)
Dept: URBAN - METROPOLITAN AREA CLINIC 112 | Facility: CLINIC | Age: 62
Setting detail: OPHTHALMOLOGY
End: 2022-07-07
Payer: MEDICAID

## 2022-07-07 DIAGNOSIS — H43.392: ICD-10-CM

## 2022-07-07 DIAGNOSIS — H25.13: ICD-10-CM

## 2022-07-07 DIAGNOSIS — H40.033: ICD-10-CM

## 2022-07-07 PROCEDURE — 92004 COMPRE OPH EXAM NEW PT 1/>: CPT | Performed by: OPHTHALMOLOGY

## 2022-07-07 PROCEDURE — 92250 FUNDUS PHOTOGRAPHY W/I&R: CPT | Performed by: OPHTHALMOLOGY

## 2022-07-07 ASSESSMENT — KERATOMETRY
OD_K2POWER_DIOPTERS: 45.00
OS_K2POWER_DIOPTERS: 45.25
OD_AXISANGLE_DEGREES: 093
OS_K1POWER_DIOPTERS: 44.00
OD_K1POWER_DIOPTERS: 44.50
OS_AXISANGLE_DEGREES: 107

## 2022-07-07 ASSESSMENT — TONOMETRY
OD_IOP_MMHG: 11
OS_IOP_MMHG: 12

## 2022-07-07 ASSESSMENT — VISUAL ACUITY
OS_BCVA: 20/30
OD_BCVA: 20/30+1

## 2022-07-07 ASSESSMENT — SPHEQUIV_DERIVED
OD_SPHEQUIV: 1.375
OS_SPHEQUIV: 1.75

## 2022-07-07 ASSESSMENT — REFRACTION_AUTOREFRACTION
OS_SPHERE: +2.00
OS_AXIS: 061
OD_AXIS: 075
OD_SPHERE: +1.75
OD_CYLINDER: -0.75
OS_CYLINDER: -0.50

## 2022-07-07 ASSESSMENT — AXIALLENGTH_DERIVED
OD_AL: 22.6373
OS_AL: 22.5454

## 2022-07-07 ASSESSMENT — CONFRONTATIONAL VISUAL FIELD TEST (CVF)
OS_FINDINGS: FULL
OD_FINDINGS: FULL

## 2022-07-26 ENCOUNTER — EMERGENCY (EMERGENCY)
Facility: HOSPITAL | Age: 62
LOS: 1 days | Discharge: HOME | End: 2022-07-26
Attending: STUDENT IN AN ORGANIZED HEALTH CARE EDUCATION/TRAINING PROGRAM
Payer: SELF-PAY

## 2022-07-26 VITALS
TEMPERATURE: 98 F | HEIGHT: 64 IN | RESPIRATION RATE: 16 BRPM | OXYGEN SATURATION: 98 % | DIASTOLIC BLOOD PRESSURE: 74 MMHG | SYSTOLIC BLOOD PRESSURE: 129 MMHG | WEIGHT: 145.06 LBS | HEART RATE: 59 BPM

## 2022-07-26 DIAGNOSIS — O34.219 MATERNAL CARE FOR UNSPECIFIED TYPE SCAR FROM PREVIOUS CESAREAN DELIVERY: Chronic | ICD-10-CM

## 2022-07-26 PROCEDURE — 73660 X-RAY EXAM OF TOE(S): CPT

## 2022-07-26 PROCEDURE — 73660 X-RAY EXAM OF TOE(S): CPT | Mod: 26,LT

## 2022-07-26 PROCEDURE — 99283 EMERGENCY DEPT VISIT LOW MDM: CPT

## 2022-07-26 RX ORDER — IBUPROFEN 200 MG
600 TABLET ORAL ONCE
Refills: 0 | Status: COMPLETED | OUTPATIENT
Start: 2022-07-26 | End: 2022-07-26

## 2022-07-26 RX ADMIN — Medication 600 MILLIGRAM(S): at 16:53

## 2022-07-26 RX ADMIN — Medication 600 MILLIGRAM(S): at 16:50

## 2022-07-26 NOTE — ED PROVIDER NOTE - CLINICAL SUMMARY MEDICAL DECISION MAKING FREE TEXT BOX
61 y/o F with PMH HLD presents to ED c/o left big toe pain s/p tripping and falling 45 min prior to arrival. PE remarkable for TTP over PIP over left great toe.  Pain control, Left foot xr  will monitor and reassess

## 2022-07-26 NOTE — ED PROVIDER NOTE - NS ED ATTENDING STATEMENT MOD
This was a shared visit with the WM. I reviewed and verified the documentation and independently performed the documented:

## 2022-07-26 NOTE — ED PROVIDER NOTE - NSFOLLOWUPINSTRUCTIONS_ED_ALL_ED_FT
Please take tylenol or motrin as needed for pain    Rest    Ice    Elevate lower extremity    Return if symptoms worsen    Follow up with podiatry

## 2022-07-26 NOTE — ED PROVIDER NOTE - CARE PROVIDER_API CALL
Garrett Mcnamara (DPM)  Podiatric Medicine and Surgery  [address] [place] [address]  Phone: [phone]  Fax: [phone]  Follow Up Time:

## 2022-07-26 NOTE — ED PROVIDER NOTE - ATTENDING APP SHARED VISIT CONTRIBUTION OF CARE
62F pmh HLD w. left foot pain s/p fall, on exam without significant findings though will check xr for assess of poss bony injury. If neg d/c ot opd Follow up.     I have personally performed a face to face diagnostic evaluation on this patient.  I have reviewed the ACP note and agree with the history, exam, and plan of care, except as noted.   My medical decision making and observations are found above.

## 2022-07-26 NOTE — ED PROVIDER NOTE - MUSCULOSKELETAL, MLM
+TTP over PIP of left great toe, full ROM, no swelling or discoloration. Spine appears normal, range of motion is not limited, no muscle or joint tenderness

## 2022-07-26 NOTE — ED PROVIDER NOTE - OBJECTIVE STATEMENT
61 y/o F with PMH HLD presents to ED c/o left big toe pain s/p tripping and falling 45 min prior to arrival. Pt reports pain currently 7/10. Able to bear weight but reports pain with ambulation. Endorses full ROM of toe. Denies use of otc medication for pain relief. Pt denies fever, chills, head trauma, LOC, headache, neck pain, CP, SOB, N/V/D, abd pain, ext numbness/weakness/tingling.

## 2022-08-18 ENCOUNTER — OFFICE (OUTPATIENT)
Dept: URBAN - METROPOLITAN AREA CLINIC 112 | Facility: CLINIC | Age: 62
Setting detail: OPHTHALMOLOGY
End: 2022-08-18
Payer: MEDICAID

## 2022-08-18 DIAGNOSIS — H25.13: ICD-10-CM

## 2022-08-18 DIAGNOSIS — H40.033: ICD-10-CM

## 2022-08-18 DIAGNOSIS — H43.392: ICD-10-CM

## 2022-08-18 DIAGNOSIS — H25.12: ICD-10-CM

## 2022-08-18 PROBLEM — H25.11 CATARACT SENILE NUCLEAR SCLEROSIS; RIGHT EYE, LEFT EYE, BOTH EYES: Status: ACTIVE | Noted: 2022-08-18

## 2022-08-18 PROCEDURE — 99214 OFFICE O/P EST MOD 30 MIN: CPT | Performed by: OPHTHALMOLOGY

## 2022-08-18 PROCEDURE — 92136 OPHTHALMIC BIOMETRY: CPT | Performed by: OPHTHALMOLOGY

## 2022-08-18 ASSESSMENT — KERATOMETRY
OS_K2POWER_DIOPTERS: 45.25
OD_K2POWER_DIOPTERS: 45.00
OD_CYLAXISANGLE_DEGREES: 093
OS_CYLAXISANGLE_DEGREES: 107
OD_K2POWER_DIOPTERS: 45.00
OD_K1K2_AVERAGE: 44.75
OS_K1POWER_DIOPTERS: 44.00
OS_AXISANGLE_DEGREES: 17
OD_K1POWER_DIOPTERS: 44.50
OS_AXISANGLE2_DEGREES: 107
OD_CYLPOWER_DEGREES: 0.5
OS_K1POWER_DIOPTERS: 44.00
OD_AXISANGLE2_DEGREES: 093
OD_AXISANGLE_DEGREES: 3
OS_AXISANGLE_DEGREES: 107
OS_K2POWER_DIOPTERS: 45.25
OS_K1K2_AVERAGE: 44.625
OD_K1POWER_DIOPTERS: 44.50
OD_AXISANGLE_DEGREES: 093
OS_CYLPOWER_DEGREES: 1.25

## 2022-08-18 ASSESSMENT — REFRACTION_AUTOREFRACTION
OS_AXIS: 050
OD_SPHERE: +2.00
OD_CYLINDER: -0.75
OS_SPHERE: +2.00
OD_AXIS: 082
OS_CYLINDER: -0.25

## 2022-08-18 ASSESSMENT — VISUAL ACUITY
OS_BCVA: 20/25-1
OD_BCVA: 20/25-1

## 2022-08-18 ASSESSMENT — AXIALLENGTH_DERIVED
OS_AL: 22.5011
OD_AL: 22.548

## 2022-08-18 ASSESSMENT — SPHEQUIV_DERIVED
OS_SPHEQUIV: 1.875
OD_SPHEQUIV: 1.625

## 2022-08-18 ASSESSMENT — CONFRONTATIONAL VISUAL FIELD TEST (CVF)
OD_FINDINGS: FULL
OS_FINDINGS: FULL

## 2022-08-26 ENCOUNTER — APPOINTMENT (OUTPATIENT)
Dept: PULMONOLOGY | Facility: CLINIC | Age: 62
End: 2022-08-26

## 2022-08-26 VITALS
WEIGHT: 165 LBS | DIASTOLIC BLOOD PRESSURE: 70 MMHG | RESPIRATION RATE: 16 BRPM | HEART RATE: 80 BPM | SYSTOLIC BLOOD PRESSURE: 120 MMHG | BODY MASS INDEX: 29.23 KG/M2 | OXYGEN SATURATION: 98 %

## 2022-08-26 DIAGNOSIS — Z87.891 PERSONAL HISTORY OF NICOTINE DEPENDENCE: ICD-10-CM

## 2022-08-26 PROBLEM — E78.5 HYPERLIPIDEMIA, UNSPECIFIED: Chronic | Status: ACTIVE | Noted: 2022-07-26

## 2022-08-26 PROCEDURE — 99214 OFFICE O/P EST MOD 30 MIN: CPT

## 2022-11-04 ENCOUNTER — APPOINTMENT (OUTPATIENT)
Dept: PULMONOLOGY | Facility: CLINIC | Age: 62
End: 2022-11-04

## 2022-11-17 NOTE — ED ADULT TRIAGE NOTE - PRO INTERPRETER NEED 2
[Dear  ___] : Dear  [unfilled], [Consult Letter:] : I had the pleasure of evaluating your patient, [unfilled]. [Please see my note below.] : Please see my note below. [Consult Closing:] : Thank you very much for allowing me to participate in the care of this patient.  If you have any questions, please do not hesitate to contact me. [Sincerely,] : Sincerely, [FreeTextEntry3] : AMEYA Clarke\par Certified Pediatric Nurse Practitioner \par Pediatric Neurology \par [facility]\par \par Shilpa Childers MD\par Attending, Pediatric Neurology \par [facility]\par  English

## 2023-03-03 ENCOUNTER — APPOINTMENT (OUTPATIENT)
Dept: OBGYN | Facility: CLINIC | Age: 63
End: 2023-03-03
Payer: MEDICAID

## 2023-03-03 VITALS
DIASTOLIC BLOOD PRESSURE: 61 MMHG | HEIGHT: 63 IN | WEIGHT: 167 LBS | BODY MASS INDEX: 29.59 KG/M2 | SYSTOLIC BLOOD PRESSURE: 112 MMHG

## 2023-03-03 DIAGNOSIS — N95.1 MENOPAUSAL AND FEMALE CLIMACTERIC STATES: ICD-10-CM

## 2023-03-03 DIAGNOSIS — N90.5 ATROPHY OF VULVA: ICD-10-CM

## 2023-03-03 PROCEDURE — 99205 OFFICE O/P NEW HI 60 MIN: CPT | Mod: 25

## 2023-03-03 PROCEDURE — 99397 PER PM REEVAL EST PAT 65+ YR: CPT

## 2023-03-03 NOTE — PHYSICAL EXAM
[Chaperone Present] : A chaperone was present in the examining room during all aspects of the physical examination [FreeTextEntry1] : Meghana [Appropriately responsive] : appropriately responsive [Alert] : alert [No Acute Distress] : no acute distress [No Lymphadenopathy] : no lymphadenopathy [Regular Rate Rhythm] : regular rate rhythm [No Murmurs] : no murmurs [Clear to Auscultation B/L] : clear to auscultation bilaterally [Soft] : soft [Non-tender] : non-tender [Non-distended] : non-distended [No HSM] : No HSM [No Lesions] : no lesions [No Mass] : no mass [Oriented x3] : oriented x3 [Examination Of The Breasts] : a normal appearance [No Masses] : no breast masses were palpable [Vulvar Atrophy] : vulvar atrophy [Labia Majora] : normal [Labia Minora] : normal [Atrophy] : atrophy [Normal] : normal [Uterine Adnexae] : normal

## 2023-03-06 LAB
C TRACH RRNA SPEC QL NAA+PROBE: NOT DETECTED
HPV HIGH+LOW RISK DNA PNL CVX: NOT DETECTED
N GONORRHOEA RRNA SPEC QL NAA+PROBE: NOT DETECTED
SOURCE TP AMPLIFICATION: NORMAL

## 2023-03-07 LAB — CYTOLOGY CVX/VAG DOC THIN PREP: ABNORMAL

## 2023-03-22 ENCOUNTER — APPOINTMENT (OUTPATIENT)
Dept: PULMONOLOGY | Facility: CLINIC | Age: 63
End: 2023-03-22
Payer: MEDICAID

## 2023-03-22 VITALS
HEART RATE: 75 BPM | OXYGEN SATURATION: 97 % | DIASTOLIC BLOOD PRESSURE: 72 MMHG | RESPIRATION RATE: 16 BRPM | SYSTOLIC BLOOD PRESSURE: 124 MMHG

## 2023-03-22 VITALS — BODY MASS INDEX: 32.9 KG/M2 | WEIGHT: 172 LBS | HEIGHT: 60.5 IN

## 2023-03-22 PROCEDURE — 99214 OFFICE O/P EST MOD 30 MIN: CPT | Mod: 25

## 2023-03-22 PROCEDURE — 94729 DIFFUSING CAPACITY: CPT

## 2023-03-22 PROCEDURE — 94727 GAS DIL/WSHOT DETER LNG VOL: CPT

## 2023-03-22 PROCEDURE — 94010 BREATHING CAPACITY TEST: CPT

## 2023-03-22 PROCEDURE — 85018 HEMOGLOBIN: CPT | Mod: QW

## 2023-03-22 RX ORDER — ALBUTEROL SULFATE 90 UG/1
108 (90 BASE) INHALANT RESPIRATORY (INHALATION)
Qty: 1 | Refills: 5 | Status: DISCONTINUED | COMMUNITY
Start: 2022-08-26 | End: 2023-03-22

## 2023-03-22 NOTE — HISTORY OF PRESENT ILLNESS
[TextBox_4] : Patient reports no significant shortness of breath.  She is no longer smoking.  Denies excessive daytime sleepiness.

## 2023-03-22 NOTE — REASON FOR VISIT
[Follow-Up] : a follow-up visit [Sleep Apnea] : sleep apnea [Emphysema] : emphysema [Ad Hoc ] : provided by an ad hoc  [Interpreters_FullName] : Eveline [TWNoteComboBox1] : Pakistani

## 2023-03-22 NOTE — ASSESSMENT
[FreeTextEntry1] : Patient with emphysematous changes on her CT scan without any functional abnormalities.  She is no longer smoking.  She does not require medications at this time.\par \par Mild obstructive sleep apnea asymptomatic.  The patient does not wish to be treated for this at this time.\par \par At this point the patient will be seen as needed.  She was told to return if she develops increasing excessive daytime sleepiness and she was told to try to keep her weight down.  It is unlikely that she will develop pulmonary functional abnormalities as long as she stays off cigarettes.

## 2023-03-23 ENCOUNTER — EMERGENCY (EMERGENCY)
Facility: HOSPITAL | Age: 63
LOS: 1 days | Discharge: HOME | End: 2023-03-23
Attending: EMERGENCY MEDICINE
Payer: COMMERCIAL

## 2023-03-23 VITALS
TEMPERATURE: 98 F | WEIGHT: 176.37 LBS | HEART RATE: 65 BPM | HEIGHT: 61 IN | SYSTOLIC BLOOD PRESSURE: 148 MMHG | OXYGEN SATURATION: 99 % | DIASTOLIC BLOOD PRESSURE: 72 MMHG | RESPIRATION RATE: 16 BRPM

## 2023-03-23 VITALS
SYSTOLIC BLOOD PRESSURE: 112 MMHG | RESPIRATION RATE: 12 BRPM | DIASTOLIC BLOOD PRESSURE: 69 MMHG | HEART RATE: 71 BPM | OXYGEN SATURATION: 97 %

## 2023-03-23 DIAGNOSIS — O34.219 MATERNAL CARE FOR UNSPECIFIED TYPE SCAR FROM PREVIOUS CESAREAN DELIVERY: Chronic | ICD-10-CM

## 2023-03-23 LAB
ALBUMIN SERPL ELPH-MCNC: 4.6 G/DL — SIGNIFICANT CHANGE UP (ref 3.3–5.2)
ALP SERPL-CCNC: 94 U/L — SIGNIFICANT CHANGE UP (ref 40–120)
ALT FLD-CCNC: 17 U/L — SIGNIFICANT CHANGE UP
ANION GAP SERPL CALC-SCNC: 9 MMOL/L — SIGNIFICANT CHANGE UP (ref 5–17)
APTT BLD: 32.5 SEC — SIGNIFICANT CHANGE UP (ref 27.5–35.5)
AST SERPL-CCNC: 20 U/L — SIGNIFICANT CHANGE UP
BASOPHILS # BLD AUTO: 0.03 K/UL — SIGNIFICANT CHANGE UP (ref 0–0.2)
BASOPHILS NFR BLD AUTO: 0.5 % — SIGNIFICANT CHANGE UP (ref 0–2)
BILIRUB SERPL-MCNC: 0.4 MG/DL — SIGNIFICANT CHANGE UP (ref 0.4–2)
BUN SERPL-MCNC: 16.2 MG/DL — SIGNIFICANT CHANGE UP (ref 8–20)
CALCIUM SERPL-MCNC: 9.7 MG/DL — SIGNIFICANT CHANGE UP (ref 8.4–10.5)
CHLORIDE SERPL-SCNC: 101 MMOL/L — SIGNIFICANT CHANGE UP (ref 96–108)
CO2 SERPL-SCNC: 28 MMOL/L — SIGNIFICANT CHANGE UP (ref 22–29)
CREAT SERPL-MCNC: 0.67 MG/DL — SIGNIFICANT CHANGE UP (ref 0.5–1.3)
EGFR: 97 ML/MIN/1.73M2 — SIGNIFICANT CHANGE UP
EOSINOPHIL # BLD AUTO: 0.1 K/UL — SIGNIFICANT CHANGE UP (ref 0–0.5)
EOSINOPHIL NFR BLD AUTO: 1.7 % — SIGNIFICANT CHANGE UP (ref 0–6)
GLUCOSE SERPL-MCNC: 100 MG/DL — HIGH (ref 70–99)
HCT VFR BLD CALC: 44.1 % — SIGNIFICANT CHANGE UP (ref 34.5–45)
HGB BLD-MCNC: 14.2 G/DL — SIGNIFICANT CHANGE UP (ref 11.5–15.5)
IMM GRANULOCYTES NFR BLD AUTO: 0.2 % — SIGNIFICANT CHANGE UP (ref 0–0.9)
INR BLD: 0.97 RATIO — SIGNIFICANT CHANGE UP (ref 0.88–1.16)
LIDOCAIN IGE QN: 50 U/L — SIGNIFICANT CHANGE UP (ref 22–51)
LYMPHOCYTES # BLD AUTO: 1.14 K/UL — SIGNIFICANT CHANGE UP (ref 1–3.3)
LYMPHOCYTES # BLD AUTO: 19.6 % — SIGNIFICANT CHANGE UP (ref 13–44)
MAGNESIUM SERPL-MCNC: 2.1 MG/DL — SIGNIFICANT CHANGE UP (ref 1.6–2.6)
MCHC RBC-ENTMCNC: 30 PG — SIGNIFICANT CHANGE UP (ref 27–34)
MCHC RBC-ENTMCNC: 32.2 GM/DL — SIGNIFICANT CHANGE UP (ref 32–36)
MCV RBC AUTO: 93 FL — SIGNIFICANT CHANGE UP (ref 80–100)
MONOCYTES # BLD AUTO: 0.37 K/UL — SIGNIFICANT CHANGE UP (ref 0–0.9)
MONOCYTES NFR BLD AUTO: 6.4 % — SIGNIFICANT CHANGE UP (ref 2–14)
NEUTROPHILS # BLD AUTO: 4.17 K/UL — SIGNIFICANT CHANGE UP (ref 1.8–7.4)
NEUTROPHILS NFR BLD AUTO: 71.6 % — SIGNIFICANT CHANGE UP (ref 43–77)
NT-PROBNP SERPL-SCNC: 32 PG/ML — SIGNIFICANT CHANGE UP (ref 0–300)
PLATELET # BLD AUTO: 255 K/UL — SIGNIFICANT CHANGE UP (ref 150–400)
POTASSIUM SERPL-MCNC: 3.9 MMOL/L — SIGNIFICANT CHANGE UP (ref 3.5–5.3)
POTASSIUM SERPL-SCNC: 3.9 MMOL/L — SIGNIFICANT CHANGE UP (ref 3.5–5.3)
PROT SERPL-MCNC: 7.6 G/DL — SIGNIFICANT CHANGE UP (ref 6.6–8.7)
PROTHROM AB SERPL-ACNC: 11.3 SEC — SIGNIFICANT CHANGE UP (ref 10.5–13.4)
RBC # BLD: 4.74 M/UL — SIGNIFICANT CHANGE UP (ref 3.8–5.2)
RBC # FLD: 12.8 % — SIGNIFICANT CHANGE UP (ref 10.3–14.5)
SODIUM SERPL-SCNC: 138 MMOL/L — SIGNIFICANT CHANGE UP (ref 135–145)
TROPONIN T SERPL-MCNC: <0.01 NG/ML — SIGNIFICANT CHANGE UP (ref 0–0.06)
WBC # BLD: 5.82 K/UL — SIGNIFICANT CHANGE UP (ref 3.8–10.5)
WBC # FLD AUTO: 5.82 K/UL — SIGNIFICANT CHANGE UP (ref 3.8–10.5)

## 2023-03-23 PROCEDURE — 93005 ELECTROCARDIOGRAM TRACING: CPT

## 2023-03-23 PROCEDURE — 71045 X-RAY EXAM CHEST 1 VIEW: CPT

## 2023-03-23 PROCEDURE — 83690 ASSAY OF LIPASE: CPT

## 2023-03-23 PROCEDURE — 85730 THROMBOPLASTIN TIME PARTIAL: CPT

## 2023-03-23 PROCEDURE — 85025 COMPLETE CBC W/AUTO DIFF WBC: CPT

## 2023-03-23 PROCEDURE — 36415 COLL VENOUS BLD VENIPUNCTURE: CPT

## 2023-03-23 PROCEDURE — 85610 PROTHROMBIN TIME: CPT

## 2023-03-23 PROCEDURE — 71045 X-RAY EXAM CHEST 1 VIEW: CPT | Mod: 26

## 2023-03-23 PROCEDURE — T1013: CPT

## 2023-03-23 PROCEDURE — 83735 ASSAY OF MAGNESIUM: CPT

## 2023-03-23 PROCEDURE — 80053 COMPREHEN METABOLIC PANEL: CPT

## 2023-03-23 PROCEDURE — 83880 ASSAY OF NATRIURETIC PEPTIDE: CPT

## 2023-03-23 PROCEDURE — 96374 THER/PROPH/DIAG INJ IV PUSH: CPT

## 2023-03-23 PROCEDURE — 93010 ELECTROCARDIOGRAM REPORT: CPT

## 2023-03-23 PROCEDURE — 99285 EMERGENCY DEPT VISIT HI MDM: CPT

## 2023-03-23 PROCEDURE — 84484 ASSAY OF TROPONIN QUANT: CPT

## 2023-03-23 PROCEDURE — 99285 EMERGENCY DEPT VISIT HI MDM: CPT | Mod: 25

## 2023-03-23 RX ORDER — FAMOTIDINE 10 MG/ML
20 INJECTION INTRAVENOUS ONCE
Refills: 0 | Status: COMPLETED | OUTPATIENT
Start: 2023-03-23 | End: 2023-03-23

## 2023-03-23 RX ADMIN — FAMOTIDINE 20 MILLIGRAM(S): 10 INJECTION INTRAVENOUS at 13:33

## 2023-03-23 NOTE — ED PROVIDER NOTE - PHYSICAL EXAMINATION
General-alert and oriented to person place and time, nontoxic appearing, pleasant cooperative, NAD  HEENT-normocephalic, atraumatic, NT to palp, EOMI, PERRLA, no conjunctival injections,   Neck- supple, trach midline, No JVD, no LAD  Chest- Nt to palp, no reproducible pain  Cardio-s1,s2 present, regular rate and rhythm  Resp- talks in full sentences, symmetrical chest rise, CTA bilat, no evidence of wheezes, rhonchi noted  Abdomen- bowel sounds presnt in all 4 quadrants, soft, NT/ND, no guarding, no rebound tenderness  MSK- moves all extremities, able to ambulate without issues  Back- nt to palp of cervical, thoracic, lumbar spine, nt to palp of paraspinal m., No CVA tenderness  Neuro- no focal deficits, sensation intact

## 2023-03-23 NOTE — ED PROVIDER NOTE - PATIENT PORTAL LINK FT
You can access the FollowMyHealth Patient Portal offered by [facility] by registering at the following website: [email or web address]. By joining [employer]’s FollowMyHealth portal, you will also be able to view your health information using other applications (apps) compatible with our system.

## 2023-03-23 NOTE — ED PROVIDER NOTE - ATTENDING APP SHARED VISIT CONTRIBUTION OF CARE
64 y/o female with hx of HLD, gastric cancer s/p excision presents to the ED c/o substernal chest pain that began around 10 pm this morning. pt with troponin x 1 negative, labs stable, chest xray with no acute abnormalities, liekly realted to GERd, pt will f/u with GI and is seeing premeire cardio tomrorow for NST, Pt reassessed, pt feeling better at this time, vss, pt able to walk, talk and vocalized plan of action. Discussed in depth and explained to pt in depth the next steps that need to be taking including proper follow up with PCP or specialists. All incidental findings were discussed with pt as well. Pt verbalized their concerns and all questions were answered. Pt understands dispo and wants discharge. Given good instructions when to return to ED and importance of f/u.

## 2023-03-23 NOTE — ED ADULT NURSE NOTE - TEMPLATE
ATTENDING STATEMENT for exam on:  2018    Patient is an ex- Gestational Age  39.5 (19 Aug 2018 18:13)   week Male now 2d.   Overnight: no acute events overnight reported, working on feeding.  Initial period of respiratory tachypnea resolved.  Breast feeding today, yesterday bottle      [x] voiding and stooling appropriately  Vital Signs Last 24 Hrs  T(C): 36.5 (21 Aug 2018 10:14), Max: 36.9 (21 Aug 2018 00:15)  T(F): 97.7 (21 Aug 2018 10:14), Max: 98.4 (21 Aug 2018 00:15)  HR: 125 (21 Aug 2018 10:14) (125 - 142)  BP: --  BP(mean): --  RR: 50 (21 Aug 2018 10:14) (44 - 50)  SpO2: -- Daily     Daily Weight Gm: 3180 (21 Aug 2018 00:15)    Physical Exam:   GEN: nad  HEENT: mmm, afof  Chest: nml s1/s2, RRR, no murmurs appreciated, LCTA b/l  Abd: s/nt/nd, normoactive bowel sounds, no HSM appreciated, umbilicus c/d/i  : external genitalia wnl  Skin: no rash  Neuro: +grasp / suck / gordo, tone wnl  Hips: negative ortolani and ribeiro    Bilirubin, If applicable:     Glucose, If applicable: CAPILLARY BLOOD GLUCOSE          A/P 2d Male .   If applicable, active issues include:   - plan for feeding support  - discharge planning and  care education for family  - outpatient thyroid studies for maternal hashimoto  [ ] glucose monitoring, per guideline  [ ] q4h sign monitoring for chorio/gbs/other per guideline  [ ] darius positive or elevated umbilical cord blirubin, serial bilirubin levels +/- hematocrit/reticulocyte count  [ ] breech presentation of  - ultrasound at 4-6 weeks of age  [ ] circumcision care  [ ] late  infant, car seat challenge and other  precautions    Anticipated Discharge Date:  [x] Reviewed lab results and/or Radiology  [ ] Spoke with consultant and/or Social Work  [x] Spoke with family about feeding plan and/or other aspects of  care    [ x] time spent on encounter and associated coordination of care: > 35 minutes    Yane Hines MD  Pediatric Hospitalist Cardiac

## 2023-03-23 NOTE — ED PROVIDER NOTE - CLINICAL SUMMARY MEDICAL DECISION MAKING FREE TEXT BOX
64 y/o female with hx of HLD, gastric cancer s/p excision presents to the ED c/o substernal chest pain that began around 10 pm this morning. 64 y/o female with hx of HLD, gastric cancer s/p excision presents to the ED c/o substernal chest pain that began around 10 pm this morning. pt with troponin x 1 negative, labs stable, chest xray with no acute abnormalities, liekly realted to GERd, pt will f/u with GI and is seeing premeire cardio tomrorow for NST, Pt reassessed, pt feeling better at this time, vss, pt able to walk, talk and vocalized plan of action. Discussed in depth and explained to pt in depth the next steps that need to be taking including proper follow up with PCP or specialists. All incidental findings were discussed with pt as well. Pt verbalized their concerns and all questions were answered. Pt understands dispo and wants discharge. Given good instructions when to return to ED and importance of f/u.

## 2023-03-23 NOTE — ED PROVIDER NOTE - OBJECTIVE STATEMENT
64 y/o female with hx of HLD, gastric cancer s/p excision presents to the ED c/o substernal chest pain that began around 10 pm this morning. Pt ntoes the substernal chest pain began an hour after eating and relieved by drinking water. Pt does admits she did walk this morning which is unique for her. Notes feels similar to her episode in the past. Follows up with [facility] with EGD every 3 months, Notes she does follow with cardio and due for a stress test tomorrow with [facility]. No associated nausea, vomiting, sob, fevers, chills, cough.

## 2023-03-23 NOTE — ED ADULT NURSE NOTE - OBJECTIVE STATEMENT
63F nad aaox3 c/o cp and  epigastric pain onset 1020 today, has stress test tomorrow,  [place] at bedside to translate, cardiac monitoring and fall precautions in place.

## 2023-04-12 NOTE — ED PROVIDER NOTE - CONDUCTED A DETAILED DISCUSSION WITH PATIENT AND/OR GUARDIAN REGARDING, MDM
lab results/radiology results/need for outpatient follow-up Dupixent Pregnancy And Lactation Text: This medication likely crosses the placenta but the risk for the fetus is uncertain. This medication is excreted in breast milk.

## 2023-04-20 NOTE — ED ADULT TRIAGE NOTE - AS TEMP SITE
oral Finasteride Male Counseling: Finasteride Counseling:  I discussed with the patient the risks of use of finasteride including but not limited to decreased libido, decreased ejaculate volume, gynecomastia, and depression. Women should not handle medication.  All of the patient's questions and concerns were addressed. Finasteride Counseling:  I discussed with the patient the risks of use of finasteride including but not limited to decreased libido, decreased ejaculate volume, gynecomastia, and depression. Women should not handle medication.  All of the patient's questions and concerns were addressed.

## 2023-05-26 DIAGNOSIS — D25.9 LEIOMYOMA OF UTERUS, UNSPECIFIED: ICD-10-CM

## 2023-05-26 PROBLEM — C16.9 MALIGNANT NEOPLASM OF STOMACH, UNSPECIFIED: Chronic | Status: ACTIVE | Noted: 2023-03-23

## 2023-06-05 ENCOUNTER — OFFICE (OUTPATIENT)
Dept: URBAN - METROPOLITAN AREA CLINIC 12 | Facility: CLINIC | Age: 63
Setting detail: OPHTHALMOLOGY
End: 2023-06-05
Payer: MEDICAID

## 2023-06-05 DIAGNOSIS — H25.12: ICD-10-CM

## 2023-06-05 DIAGNOSIS — H25.13: ICD-10-CM

## 2023-06-05 PROCEDURE — 92136 OPHTHALMIC BIOMETRY: CPT | Performed by: OPHTHALMOLOGY

## 2023-06-05 PROCEDURE — 99213 OFFICE O/P EST LOW 20 MIN: CPT | Performed by: OPHTHALMOLOGY

## 2023-06-05 ASSESSMENT — CONFRONTATIONAL VISUAL FIELD TEST (CVF)
OD_FINDINGS: FULL
OS_FINDINGS: FULL

## 2023-06-05 ASSESSMENT — SPHEQUIV_DERIVED
OS_SPHEQUIV: 1.5
OS_SPHEQUIV: 1.625
OD_SPHEQUIV: 1.375
OD_SPHEQUIV: 0.875

## 2023-06-05 ASSESSMENT — KERATOMETRY
OD_K2POWER_DIOPTERS: 45.25
OD_K1POWER_DIOPTERS: 44.50
OS_K1POWER_DIOPTERS: 44.25
OS_AXISANGLE_DEGREES: 111
OD_AXISANGLE_DEGREES: 104
METHOD_AUTO_MANUAL: AUTO
OS_K2POWER_DIOPTERS: 45.00

## 2023-06-05 ASSESSMENT — REFRACTION_MANIFEST
OD_ADD: +2.00
OD_VA1: 20/30-1
OS_CYLINDER: -0.50
OS_AXIS: 77
OS_VA1: 20/40
OD_CYLINDER: -0.75
OD_SPHERE: +1.75
OS_SPHERE: +1.75
OS_ADD: +2.00
OD_AXIS: 82

## 2023-06-05 ASSESSMENT — REFRACTION_AUTOREFRACTION
OD_CYLINDER: -0.75
OS_SPHERE: +2.00
OS_AXIS: 078
OD_AXIS: 077
OD_SPHERE: +1.25
OS_CYLINDER: -0.75

## 2023-06-05 ASSESSMENT — AXIALLENGTH_DERIVED
OS_AL: 22.6348
OS_AL: 22.59
OD_AL: 22.7752
OD_AL: 22.5951

## 2023-06-05 ASSESSMENT — REFRACTION_CURRENTRX
OS_OVR_VA: 20/
OS_SPHERE: +1.50
OD_ADD: +2.00
OD_OVR_VA: 20/
OS_ADD: +2.00
OD_SPHERE: +1.50

## 2023-06-05 ASSESSMENT — VISUAL ACUITY
OD_BCVA: 20/50-1
OS_BCVA: 20/30-1

## 2023-06-13 ENCOUNTER — ASC (OUTPATIENT)
Dept: URBAN - METROPOLITAN AREA SURGERY 8 | Facility: SURGERY | Age: 63
Setting detail: OPHTHALMOLOGY
End: 2023-06-13
Payer: MEDICAID

## 2023-06-13 DIAGNOSIS — H25.12: ICD-10-CM

## 2023-06-13 PROCEDURE — 66984 XCAPSL CTRC RMVL W/O ECP: CPT | Performed by: OPHTHALMOLOGY

## 2023-06-14 ENCOUNTER — OFFICE (OUTPATIENT)
Dept: URBAN - METROPOLITAN AREA CLINIC 12 | Facility: CLINIC | Age: 63
Setting detail: OPHTHALMOLOGY
End: 2023-06-14
Payer: MEDICAID

## 2023-06-14 ENCOUNTER — RX ONLY (RX ONLY)
Age: 63
End: 2023-06-14

## 2023-06-14 DIAGNOSIS — Z96.1: ICD-10-CM

## 2023-06-14 PROCEDURE — 99024 POSTOP FOLLOW-UP VISIT: CPT | Performed by: OPTOMETRIST

## 2023-06-14 ASSESSMENT — REFRACTION_MANIFEST
OS_CYLINDER: -0.50
OD_ADD: +2.00
OS_SPHERE: +1.75
OS_VA1: 20/40
OD_SPHERE: +1.75
OS_AXIS: 77
OS_ADD: +2.00
OD_CYLINDER: -0.75
OD_VA1: 20/30-1
OD_AXIS: 82

## 2023-06-14 ASSESSMENT — TONOMETRY
OD_IOP_MMHG: 12
OS_IOP_MMHG: 19

## 2023-06-14 ASSESSMENT — SPHEQUIV_DERIVED
OD_SPHEQUIV: 1.375
OS_SPHEQUIV: 1.5
OD_SPHEQUIV: 0.75
OS_SPHEQUIV: -0.625

## 2023-06-14 ASSESSMENT — KERATOMETRY
OD_K2POWER_DIOPTERS: 44.75
OS_K2POWER_DIOPTERS: 46.25
OS_AXISANGLE_DEGREES: 088
METHOD_AUTO_MANUAL: AUTO
OS_K1POWER_DIOPTERS: 45.00
OD_AXISANGLE_DEGREES: 094
OD_K1POWER_DIOPTERS: 44.50

## 2023-06-14 ASSESSMENT — CONFRONTATIONAL VISUAL FIELD TEST (CVF)
OS_FINDINGS: FULL
OD_FINDINGS: FULL

## 2023-06-14 ASSESSMENT — REFRACTION_CURRENTRX
OS_ADD: +2.00
OD_ADD: +2.00
OD_OVR_VA: 20/
OS_SPHERE: +1.50
OS_OVR_VA: 20/
OD_SPHERE: +1.50

## 2023-06-14 ASSESSMENT — REFRACTION_AUTOREFRACTION
OD_AXIS: 079
OD_SPHERE: +1.25
OD_CYLINDER: -1.00
OS_AXIS: 016
OS_SPHERE: 0.00
OS_CYLINDER: -1.25

## 2023-06-14 ASSESSMENT — AXIALLENGTH_DERIVED
OS_AL: 23.0668
OD_AL: 22.6797
OD_AL: 22.907
OS_AL: 22.3016

## 2023-06-14 ASSESSMENT — VISUAL ACUITY
OS_BCVA: 20/40-2
OD_BCVA: 20/50

## 2023-06-21 ENCOUNTER — OFFICE (OUTPATIENT)
Dept: URBAN - METROPOLITAN AREA CLINIC 12 | Facility: CLINIC | Age: 63
Setting detail: OPHTHALMOLOGY
End: 2023-06-21
Payer: MEDICAID

## 2023-06-21 DIAGNOSIS — H25.11: ICD-10-CM

## 2023-06-21 PROCEDURE — 92136 OPHTHALMIC BIOMETRY: CPT | Performed by: OPHTHALMOLOGY

## 2023-06-21 ASSESSMENT — VISUAL ACUITY
OD_BCVA: 20/40
OS_BCVA: 20/40

## 2023-06-21 ASSESSMENT — REFRACTION_MANIFEST
OD_ADD: +2.00
OS_CYLINDER: -0.50
OS_SPHERE: +1.75
OD_CYLINDER: -0.75
OD_AXIS: 82
OD_SPHERE: +1.75
OS_AXIS: 77
OS_ADD: +2.00
OS_VA1: 20/40
OD_VA1: 20/30-1

## 2023-06-21 ASSESSMENT — AXIALLENGTH_DERIVED
OD_AL: 22.5951
OS_AL: 22.384
OS_AL: 22.8769
OD_AL: 22.8664

## 2023-06-21 ASSESSMENT — REFRACTION_AUTOREFRACTION
OS_AXIS: 029
OD_AXIS: 015
OD_SPHERE: +1.00
OS_CYLINDER: -1.25
OD_CYLINDER: -0.75
OS_SPHERE: +0.75

## 2023-06-21 ASSESSMENT — TONOMETRY
OS_IOP_MMHG: 12
OD_IOP_MMHG: 11

## 2023-06-21 ASSESSMENT — CONFRONTATIONAL VISUAL FIELD TEST (CVF)
OD_FINDINGS: FULL
OS_FINDINGS: FULL

## 2023-06-21 ASSESSMENT — REFRACTION_CURRENTRX
OS_SPHERE: +1.50
OS_ADD: +2.00
OD_ADD: +2.00
OS_OVR_VA: 20/
OD_OVR_VA: 20/
OD_SPHERE: +1.50

## 2023-06-21 ASSESSMENT — KERATOMETRY
METHOD_AUTO_MANUAL: AUTO
OS_AXISANGLE_DEGREES: 099
OD_K2POWER_DIOPTERS: 45.00
OS_K2POWER_DIOPTERS: 46.75
OD_AXISANGLE_DEGREES: 093
OS_K1POWER_DIOPTERS: 44.00
OD_K1POWER_DIOPTERS: 44.75

## 2023-06-21 ASSESSMENT — SPHEQUIV_DERIVED
OD_SPHEQUIV: 0.625
OD_SPHEQUIV: 1.375
OS_SPHEQUIV: 0.125
OS_SPHEQUIV: 1.5

## 2023-06-22 ENCOUNTER — APPOINTMENT (OUTPATIENT)
Dept: INTERNAL MEDICINE | Facility: CLINIC | Age: 63
End: 2023-06-22

## 2023-06-27 ENCOUNTER — ASC (OUTPATIENT)
Dept: URBAN - METROPOLITAN AREA SURGERY 8 | Facility: SURGERY | Age: 63
Setting detail: OPHTHALMOLOGY
End: 2023-06-27
Payer: MEDICAID

## 2023-06-27 DIAGNOSIS — H25.11: ICD-10-CM

## 2023-06-27 PROCEDURE — 66984 XCAPSL CTRC RMVL W/O ECP: CPT | Performed by: OPHTHALMOLOGY

## 2023-06-28 ENCOUNTER — OFFICE (OUTPATIENT)
Dept: URBAN - METROPOLITAN AREA CLINIC 12 | Facility: CLINIC | Age: 63
Setting detail: OPHTHALMOLOGY
End: 2023-06-28
Payer: MEDICAID

## 2023-06-28 ENCOUNTER — RX ONLY (RX ONLY)
Age: 63
End: 2023-06-28

## 2023-06-28 DIAGNOSIS — Z96.1: ICD-10-CM

## 2023-06-28 PROCEDURE — 99024 POSTOP FOLLOW-UP VISIT: CPT | Performed by: OPTOMETRIST

## 2023-06-28 ASSESSMENT — REFRACTION_AUTOREFRACTION
OD_CYLINDER: -0.25
OS_CYLINDER: -1.00
OD_AXIS: 160
OS_AXIS: 39
OS_SPHERE: +1.00
OD_SPHERE: -0.50

## 2023-06-28 ASSESSMENT — REFRACTION_MANIFEST
OD_AXIS: 82
OD_CYLINDER: -0.75
OD_SPHERE: +1.75
OS_ADD: +2.00
OS_VA1: 20/40
OD_ADD: +2.00
OS_SPHERE: +1.75
OS_AXIS: 77
OD_VA1: 20/30-1
OS_CYLINDER: -0.50

## 2023-06-28 ASSESSMENT — KERATOMETRY
METHOD_AUTO_MANUAL: AUTO
OD_AXISANGLE_DEGREES: 90
OS_K2POWER_DIOPTERS: 45.25
OD_K1POWER_DIOPTERS: 45.00
OD_K2POWER_DIOPTERS: 45.75
OS_K1POWER_DIOPTERS: 44.00
OS_AXISANGLE_DEGREES: 113

## 2023-06-28 ASSESSMENT — VISUAL ACUITY
OS_BCVA: 20/50
OD_BCVA: 20/25-1

## 2023-06-28 ASSESSMENT — SPHEQUIV_DERIVED
OD_SPHEQUIV: 1.375
OD_SPHEQUIV: -0.625
OS_SPHEQUIV: 0.5
OS_SPHEQUIV: 1.5

## 2023-06-28 ASSESSMENT — REFRACTION_CURRENTRX
OD_OVR_VA: 20/
OS_SPHERE: +1.50
OS_OVR_VA: 20/
OD_ADD: +2.00
OD_SPHERE: +1.50
OS_ADD: +2.00

## 2023-06-28 ASSESSMENT — AXIALLENGTH_DERIVED
OS_AL: 22.9992
OS_AL: 22.6348
OD_AL: 22.4279
OD_AL: 23.1549

## 2023-06-28 ASSESSMENT — CONFRONTATIONAL VISUAL FIELD TEST (CVF)
OD_FINDINGS: FULL
OS_FINDINGS: FULL

## 2023-06-28 ASSESSMENT — TONOMETRY
OS_IOP_MMHG: 10
OD_IOP_MMHG: 11

## 2023-06-30 ENCOUNTER — APPOINTMENT (OUTPATIENT)
Dept: OBGYN | Facility: CLINIC | Age: 63
End: 2023-06-30

## 2023-07-03 ENCOUNTER — OFFICE (OUTPATIENT)
Dept: URBAN - METROPOLITAN AREA CLINIC 12 | Facility: CLINIC | Age: 63
Setting detail: OPHTHALMOLOGY
End: 2023-07-03
Payer: MEDICAID

## 2023-07-03 ENCOUNTER — RX ONLY (RX ONLY)
Age: 63
End: 2023-07-03

## 2023-07-03 DIAGNOSIS — Z96.1: ICD-10-CM

## 2023-07-03 PROCEDURE — 99024 POSTOP FOLLOW-UP VISIT: CPT | Performed by: OPTOMETRIST

## 2023-07-03 ASSESSMENT — REFRACTION_MANIFEST
OS_VA1: 20/40
OS_AXIS: 77
OD_CYLINDER: -0.75
OD_SPHERE: +1.75
OS_CYLINDER: -0.50
OD_VA1: 20/30-1
OD_ADD: +2.00
OS_SPHERE: +1.75
OD_AXIS: 82
OS_ADD: +2.00

## 2023-07-03 ASSESSMENT — REFRACTION_CURRENTRX
OS_SPHERE: +1.50
OS_OVR_VA: 20/
OD_ADD: +2.00
OD_SPHERE: +1.50
OS_ADD: +2.00
OD_OVR_VA: 20/

## 2023-07-03 ASSESSMENT — VISUAL ACUITY
OS_BCVA: 20/30-2
OD_BCVA: 20/30-1

## 2023-07-03 ASSESSMENT — KERATOMETRY
OD_K1POWER_DIOPTERS: 44.55
METHOD_AUTO_MANUAL: AUTO
OS_K1POWER_DIOPTERS: 44.00
OD_K2POWER_DIOPTERS: 45.25
OS_AXISANGLE_DEGREES: 106
OD_AXISANGLE_DEGREES: 096
OS_K2POWER_DIOPTERS: 45.25

## 2023-07-03 ASSESSMENT — SPHEQUIV_DERIVED
OS_SPHEQUIV: 1.5
OS_SPHEQUIV: 0.5
OD_SPHEQUIV: 1.375
OD_SPHEQUIV: 0.25

## 2023-07-03 ASSESSMENT — CONFRONTATIONAL VISUAL FIELD TEST (CVF)
OS_FINDINGS: FULL
OD_FINDINGS: FULL

## 2023-07-03 ASSESSMENT — AXIALLENGTH_DERIVED
OD_AL: 23
OD_AL: 22.59
OS_AL: 22.6348
OS_AL: 22.9992

## 2023-07-03 ASSESSMENT — REFRACTION_AUTOREFRACTION
OD_AXIS: 179
OD_CYLINDER: -0.50
OS_SPHERE: +1.00
OS_AXIS: 037
OD_SPHERE: +0.50
OS_CYLINDER: -1.00

## 2023-07-03 ASSESSMENT — TONOMETRY
OD_IOP_MMHG: 14
OS_IOP_MMHG: 11

## 2023-07-13 ENCOUNTER — APPOINTMENT (OUTPATIENT)
Dept: INTERNAL MEDICINE | Facility: CLINIC | Age: 63
End: 2023-07-13
Payer: MEDICAID

## 2023-07-13 VITALS
WEIGHT: 170 LBS | HEIGHT: 60.5 IN | BODY MASS INDEX: 32.51 KG/M2 | SYSTOLIC BLOOD PRESSURE: 144 MMHG | DIASTOLIC BLOOD PRESSURE: 77 MMHG | OXYGEN SATURATION: 97 % | HEART RATE: 66 BPM | TEMPERATURE: 97.8 F

## 2023-07-13 DIAGNOSIS — Z86.19 PERSONAL HISTORY OF OTHER INFECTIOUS AND PARASITIC DISEASES: ICD-10-CM

## 2023-07-13 PROCEDURE — 99204 OFFICE O/P NEW MOD 45 MIN: CPT

## 2023-07-25 ENCOUNTER — OFFICE (OUTPATIENT)
Dept: URBAN - METROPOLITAN AREA CLINIC 12 | Facility: CLINIC | Age: 63
Setting detail: OPHTHALMOLOGY
End: 2023-07-25
Payer: MEDICAID

## 2023-07-25 DIAGNOSIS — Z96.1: ICD-10-CM

## 2023-07-25 PROBLEM — H52.7 REFRACTIVE ERROR: Status: ACTIVE | Noted: 2023-07-25

## 2023-07-25 PROCEDURE — 99024 POSTOP FOLLOW-UP VISIT: CPT | Performed by: OPTOMETRIST

## 2023-07-25 ASSESSMENT — REFRACTION_AUTOREFRACTION
OD_AXIS: 164
OS_SPHERE: +1.25
OS_AXIS: 037
OD_SPHERE: +0.75
OD_CYLINDER: -0.50
OS_CYLINDER: -1.00

## 2023-07-25 ASSESSMENT — REFRACTION_MANIFEST
OS_ADD: +1.50
OS_ADD: +2.00
OD_VA1: 20/30-1
OS_AXIS: 77
OS_VA1: 20/20
OS_SPHERE: +1.75
OS_CYLINDER: -0.50
OD_SPHERE: +1.75
OD_ADD: +2.00
OD_CYLINDER: -0.75
OD_SPHERE: PLANO
OS_AXIS: 000
OD_ADD: +1.50
OD_VA1: 20/20
OS_VA1: 20/40
OS_CYLINDER: SPH
OD_AXIS: 000
OD_CYLINDER: SPH
OD_AXIS: 82
OS_SPHERE: PLANO

## 2023-07-25 ASSESSMENT — CONFRONTATIONAL VISUAL FIELD TEST (CVF)
OD_FINDINGS: FULL
OS_FINDINGS: FULL

## 2023-07-25 ASSESSMENT — REFRACTION_CURRENTRX
OS_OVR_VA: 20/
OS_ADD: +2.00
OD_OVR_VA: 20/
OD_SPHERE: +1.50
OD_ADD: +2.00
OS_SPHERE: +1.50

## 2023-07-25 ASSESSMENT — TONOMETRY
OD_IOP_MMHG: 11
OS_IOP_MMHG: 10

## 2023-07-25 ASSESSMENT — KERATOMETRY
OD_K1POWER_DIOPTERS: 44.00
OS_K1POWER_DIOPTERS: 44.00
OD_K2POWER_DIOPTERS: 45.25
OS_AXISANGLE_DEGREES: 106
OD_AXISANGLE_DEGREES: 091
OS_K2POWER_DIOPTERS: 45.00
METHOD_AUTO_MANUAL: AUTO

## 2023-07-25 ASSESSMENT — SPHEQUIV_DERIVED
OD_SPHEQUIV: 1.375
OS_SPHEQUIV: 0.75
OD_SPHEQUIV: 0.5
OS_SPHEQUIV: 1.5

## 2023-07-25 ASSESSMENT — AXIALLENGTH_DERIVED
OD_AL: 22.9992
OS_AL: 22.6771
OD_AL: 22.6797
OS_AL: 22.9503

## 2023-07-25 ASSESSMENT — VISUAL ACUITY
OD_BCVA: 20/30-1
OS_BCVA: 20/30-1

## 2023-07-26 PROBLEM — Z86.19 HISTORY OF EPSTEIN-BARR VIRUS INFECTION: Status: ACTIVE | Noted: 2023-07-26

## 2023-07-26 PROBLEM — Z86.19 HISTORY OF CYTOMEGALOVIRUS INFECTION: Status: ACTIVE | Noted: 2023-07-26

## 2023-07-26 NOTE — ASSESSMENT
[FreeTextEntry1] : 64 y/o F with history of esophageal atypia, dyslipidemia, glucose intolerance presents for further information about EBV infection. \par \par Patient was hospitalized at [facility] in 2017 for a febrile illness. 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 \par \par Nature of infection and latency discussed with patient. No need for treatment or monitoring unless patient were to require immunosuppression. \par \par She may follow up with me on an as needed treatment. All questions addressed with patient who verbalized understanding and agreement with plan.

## 2023-07-26 NOTE — PHYSICAL EXAM
[General Appearance - Alert] : alert [General Appearance - In No Acute Distress] : in no acute distress [Sclera] : the sclera and conjunctiva were normal [PERRL With Normal Accommodation] : pupils were equal in size, round, reactive to light [Extraocular Movements] : extraocular movements were intact [Outer Ear] : the ears and nose were normal in appearance [Oropharynx] : the oropharynx was normal with no thrush [Neck Appearance] : the appearance of the neck was normal [Neck Cervical Mass (___cm)] : no neck mass was observed [Exaggerated Use Of Accessory Muscles For Inspiration] : no accessory muscle use [Auscultation Breath Sounds / Voice Sounds] : lungs were clear to auscultation bilaterally [Heart Rate And Rhythm] : heart rate was normal and rhythm regular [Heart Sounds] : normal S1 and S2 [Heart Sounds Gallop] : no gallops [Murmurs] : no murmurs [Heart Sounds Pericardial Friction Rub] : no pericardial rub [Edema] : there was no peripheral edema [Bowel Sounds] : normal bowel sounds [Abdomen Soft] : soft [Abdomen Tenderness] : non-tender [Abdomen Mass (___ Cm)] : no abdominal mass palpated [Costovertebral Angle Tenderness] : no CVA tenderness [No Palpable Adenopathy] : no palpable adenopathy [Musculoskeletal - Swelling] : no joint swelling [Nail Clubbing] : no clubbing  or cyanosis of the fingernails [Motor Tone] : muscle strength and tone were normal [Skin Color & Pigmentation] : normal skin color and pigmentation [] : no rash [No Focal Deficits] : no focal deficits [Oriented To Time, Place, And Person] : oriented to person, place, and time [Affect] : the affect was normal

## 2023-07-26 NOTE — HISTORY OF PRESENT ILLNESS
[FreeTextEntry1] : 62 y/o F presents for evaluation of EBV\par \par Diagnosed with Katelynn-Barr and CMV infection back in 2017 when she was hospitalized at [facility] for a febrile illness. Back then, labs were notable for CMV  and EBV PCR <50. \par \par She came today to make sure everything is okay.  She offers no complaints and feels healthy.\par \par PMH/PSH: esophageal cancer ( no treatment required), OA, preDM, dyslipidemia, emphysema\par \par Meds: omeprazole, simvastatin, vitamin D\par \par Soc Hs: former smoker - 3 cig/day  from [age] y/o. No drug or alcohol. \par \par

## 2023-10-04 NOTE — HISTORY OF PRESENT ILLNESS
[N] : Patient is not sexually active [Y] : Positive pregnancy history [Menarche Age: ____] : age at menarche was [unfilled] [Menopause Age: ____] : age at menopause was [unfilled] [Mammogramdate] : 09/2022 [PapSmeardate] : 2020 [PGHxTotal] : 4 [[facility]] : 2 [PGHxPremature] : 0 [PGHxAbortions] : 2 [[facility] CenterxLiving] : 2 [PGHxABInduced] : 1 [PGxEctopic] : 1 appears normal and intact

## 2023-10-12 ENCOUNTER — APPOINTMENT (OUTPATIENT)
Dept: UROGYNECOLOGY | Facility: CLINIC | Age: 63
End: 2023-10-12
Payer: MEDICARE

## 2023-10-12 DIAGNOSIS — K28.9 GASTROJEJUNAL ULCER, UNSPECIFIED AS ACUTE OR CHRONIC, W/OUT HEMORRHAGE OR PERFORATION: ICD-10-CM

## 2023-10-12 DIAGNOSIS — R35.0 FREQUENCY OF MICTURITION: ICD-10-CM

## 2023-10-12 DIAGNOSIS — N39.41 URGE INCONTINENCE: ICD-10-CM

## 2023-10-12 DIAGNOSIS — N95.2 POSTMENOPAUSAL ATROPHIC VAGINITIS: ICD-10-CM

## 2023-10-12 DIAGNOSIS — Z80.49 FAMILY HISTORY OF MALIGNANT NEOPLASM OF OTHER GENITAL ORGANS: ICD-10-CM

## 2023-10-12 DIAGNOSIS — Z83.3 FAMILY HISTORY OF DIABETES MELLITUS: ICD-10-CM

## 2023-10-12 DIAGNOSIS — M47.814 SPONDYLOSIS W/OUT MYELOPATHY OR RADICULOPATHY, THORACIC REGION: ICD-10-CM

## 2023-10-12 DIAGNOSIS — K22.10 ULCER OF ESOPHAGUS W/OUT BLEEDING: ICD-10-CM

## 2023-10-12 LAB
BILIRUB UR QL STRIP: NEGATIVE
CLARITY UR: CLEAR
COLLECTION METHOD: NORMAL
GLUCOSE UR-MCNC: NEGATIVE
HCG UR QL: 0.2 EU/DL
HGB UR QL STRIP.AUTO: NEGATIVE
KETONES UR-MCNC: NEGATIVE
LEUKOCYTE ESTERASE UR QL STRIP: NEGATIVE
NITRITE UR QL STRIP: NEGATIVE
PH UR STRIP: 6
PROT UR STRIP-MCNC: NEGATIVE
SP GR UR STRIP: 1.01

## 2023-10-12 PROCEDURE — 99214 OFFICE O/P EST MOD 30 MIN: CPT | Mod: 25

## 2023-10-12 PROCEDURE — 51701 INSERT BLADDER CATHETER: CPT | Mod: 59

## 2023-10-12 PROCEDURE — 81003 URINALYSIS AUTO W/O SCOPE: CPT | Mod: QW

## 2023-10-12 RX ORDER — METFORMIN HYDROCHLORIDE 625 MG/1
TABLET ORAL
Refills: 0 | Status: ACTIVE | COMMUNITY

## 2023-10-23 ENCOUNTER — APPOINTMENT (OUTPATIENT)
Dept: UROGYNECOLOGY | Facility: CLINIC | Age: 63
End: 2023-10-23
Payer: MEDICARE

## 2023-10-23 ENCOUNTER — OFFICE (OUTPATIENT)
Dept: URBAN - METROPOLITAN AREA CLINIC 12 | Facility: CLINIC | Age: 63
Setting detail: OPHTHALMOLOGY
End: 2023-10-23
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
## 2023-10-23 DIAGNOSIS — R35.0 FREQUENCY OF MICTURITION: ICD-10-CM

## 2023-10-23 PROCEDURE — NO SHOW FE NO SHOW FEE: Performed by: OPTOMETRIST

## 2023-10-23 PROCEDURE — 51741 ELECTRO-UROFLOWMETRY FIRST: CPT

## 2023-10-23 PROCEDURE — 51784 ANAL/URINARY MUSCLE STUDY: CPT

## 2023-10-23 PROCEDURE — 51729 CYSTOMETROGRAM W/VP&UP: CPT

## 2023-10-23 PROCEDURE — 51797 INTRAABDOMINAL PRESSURE TEST: CPT

## 2023-11-02 ENCOUNTER — OFFICE (OUTPATIENT)
Dept: URBAN - METROPOLITAN AREA CLINIC 12 | Facility: CLINIC | Age: 63
Setting detail: OPHTHALMOLOGY
End: 2023-11-02
Payer: MEDICARE

## 2023-11-02 DIAGNOSIS — H35.362: ICD-10-CM

## 2023-11-02 DIAGNOSIS — Z96.1: ICD-10-CM

## 2023-11-02 DIAGNOSIS — H43.813: ICD-10-CM

## 2023-11-02 DIAGNOSIS — H43.393: ICD-10-CM

## 2023-11-02 DIAGNOSIS — E11.9: ICD-10-CM

## 2023-11-02 DIAGNOSIS — H40.033: ICD-10-CM

## 2023-11-02 PROCEDURE — 92250 FUNDUS PHOTOGRAPHY W/I&R: CPT | Performed by: OPHTHALMOLOGY

## 2023-11-02 PROCEDURE — 92014 COMPRE OPH EXAM EST PT 1/>: CPT | Performed by: OPHTHALMOLOGY

## 2023-11-02 ASSESSMENT — CONFRONTATIONAL VISUAL FIELD TEST (CVF)
OS_FINDINGS: FULL
OD_FINDINGS: FULL

## 2023-11-07 ASSESSMENT — REFRACTION_AUTOREFRACTION
OD_CYLINDER: -0.50
OD_AXIS: 002
OS_CYLINDER: -0.75
OS_AXIS: 035
OD_SPHERE: +0.50
OS_SPHERE: +0.75

## 2023-11-07 ASSESSMENT — REFRACTION_MANIFEST
OD_AXIS: 82
OD_VA1: 20/20
OS_CYLINDER: SPH
OS_ADD: +2.00
OS_VA1: 20/20
OD_SPHERE: +1.75
OD_ADD: +1.50
OS_AXIS: 000
OD_CYLINDER: -0.75
OS_CYLINDER: -0.50
OD_AXIS: 000
OS_ADD: +1.50
OS_SPHERE: +1.75
OD_SPHERE: PLANO
OD_VA1: 20/30-1
OD_CYLINDER: SPH
OS_SPHERE: PLANO
OS_VA1: 20/40
OS_AXIS: 77
OD_ADD: +2.00

## 2023-11-07 ASSESSMENT — REFRACTION_CURRENTRX
OS_SPHERE: +1.50
OS_OVR_VA: 20/
OS_ADD: +2.00
OD_SPHERE: +1.50
OD_OVR_VA: 20/
OD_ADD: +2.00

## 2023-11-07 ASSESSMENT — SPHEQUIV_DERIVED
OD_SPHEQUIV: 0.25
OS_SPHEQUIV: 1.5
OS_SPHEQUIV: 0.375
OD_SPHEQUIV: 1.375

## 2024-02-01 ENCOUNTER — APPOINTMENT (OUTPATIENT)
Dept: UROGYNECOLOGY | Facility: CLINIC | Age: 64
End: 2024-02-01
Payer: MEDICARE

## 2024-02-01 DIAGNOSIS — R32 UNSPECIFIED URINARY INCONTINENCE: ICD-10-CM

## 2024-02-01 PROCEDURE — 99214 OFFICE O/P EST MOD 30 MIN: CPT

## 2024-02-01 NOTE — DISCUSSION/SUMMARY
[FreeTextEntry1] : We talked about the types of urinary incontinence and the treatment options. We reviewed physical therapy, medication and third line treatments.  We talked about what would be involved in BTX injections here in the office as well as what is involved with sacral neuromodulation and doing the procedure in the OR. She would like to try PT so I referred her to [facility]. She does not want to take medication because she forgets taking pills. I suggested she come back in a few months to see if the PT helped. I was able to answer all of her questions.

## 2024-02-01 NOTE — REASON FOR VISIT
[Urinary Incontinence] : urinary incontinence [[employer] ] : provided by [employer]   [Time Spent: ____ minutes] : Total time spent using  services: [unfilled] minutes. The patient's primary language is not English thus required  services. [Interpreters_IDNumber] : [id number] [TWNoteComboBox1] : Irish

## 2024-02-01 NOTE — HISTORY OF PRESENT ILLNESS
[FreeTextEntry1] : Liseth has been dealing with leakage of urine for years and it has been getting worse.  She underwent urodynamic testing which showed a small bladder capacity, but we were unable to reproduce any leakage. She did a pad test and reports that her urine turned orange but she did not have any orange on her pad. We discussed what her complaints are. She reports that she is not able to hold her urine when she has urge. She has incidental loss of urine.

## 2024-05-23 ENCOUNTER — APPOINTMENT (OUTPATIENT)
Dept: NEUROLOGY | Facility: CLINIC | Age: 64
End: 2024-05-23
Payer: MEDICARE

## 2024-05-23 VITALS
OXYGEN SATURATION: 98 % | BODY MASS INDEX: 28.51 KG/M2 | HEIGHT: 64 IN | WEIGHT: 167 LBS | SYSTOLIC BLOOD PRESSURE: 126 MMHG | DIASTOLIC BLOOD PRESSURE: 67 MMHG | HEART RATE: 67 BPM

## 2024-05-23 DIAGNOSIS — Z86.39 PERSONAL HISTORY OF OTHER ENDOCRINE, NUTRITIONAL AND METABOLIC DISEASE: ICD-10-CM

## 2024-05-23 DIAGNOSIS — R73.03 PREDIABETES.: ICD-10-CM

## 2024-05-23 DIAGNOSIS — G58.8 OTHER SPECIFIED MONONEUROPATHIES: ICD-10-CM

## 2024-05-23 PROCEDURE — 99203 OFFICE O/P NEW LOW 30 MIN: CPT

## 2024-05-23 RX ORDER — METFORMIN HYDROCHLORIDE 500 MG/1
500 TABLET, COATED ORAL
Refills: 0 | Status: ACTIVE | COMMUNITY

## 2024-05-23 RX ORDER — OMEPRAZOLE 40 MG/1
40 CAPSULE, DELAYED RELEASE ORAL
Refills: 0 | Status: ACTIVE | COMMUNITY

## 2024-05-23 NOTE — REASON FOR VISIT
[Initial Evaluation] : an initial evaluation [[employer] ] : provided by [employer]   [Interpreters_IDNumber] : [id number] [Interpreters_FullName] : Duong

## 2024-05-23 NOTE — DISCUSSION/SUMMARY
[FreeTextEntry1] : Patient is a 64 year-old woman with PMH ASCENCION, who presents today for evaluation of left shoulder pain x 6 months. I feel her shoulder pain likely 2/2 rotator cuff injury or arthritis versus neuropathy. Will perform MRI shoulder and cervical spine. She will begin acetaminophen PRN for the pain. Follow-up with me after testing. All of their questions and concerns were addressed.

## 2024-05-23 NOTE — PHYSICAL EXAM
[FreeTextEntry1] : GENERAL PHYSICAL EXAM: GEN: no distress, normal affect EYES: sclera white, conjunctiva clear, no nystagmus CV: normal rhythm PULM: no respiratory distress, normal rhythm and effort EXT: no edema, no cyanosis MSK: decreased ROM of left shoulder, pain on palpation SKIN: warm, dry, no rash or lesion on exposed skin   NEUROLOGICAL EXAM: Mental Status Orientation: alert and oriented to person, place, time, and situation Language: clear and fluent, intact comprehension and repetition  Cranial Nerves II: visual fields full to confrontation  III, IV, VI: PERRL, EOMI V, VII: facial sensation and movement intact and symmetric  VIII: hearing intact  IX, X: uvula midline, soft palate elevates normally  XI: BL shoulder shrug intact  XII: tongue midline  Motor Shoulder abd: 5 (R), 4 (L) EF/EE: 5 (R), 4 (L) WF/WE: 5 (R), 5 (L) hand : 5 (R), 5 (L) HF/HE: 5 (R), 5 (L) KF/KE: 5 (R), 5 (L) DF/PF: 5 (R), 5 (L)  Tone and bulk are normal in upper and lower limbs No pronator drift  Sensation Intact to light touch in all 4 EXTs Phalens negative  Reflex 2+ in BL biceps, brachioradialis, patella  Coordination Normal FTN bilaterally Able to perform rapid, alternating movements  Gait Normal stance, stride, and pivot turn Tandem walk intact Negative Romberg

## 2024-05-23 NOTE — HISTORY OF PRESENT ILLNESS
[FreeTextEntry1] : Patient is a 64 year-old woman with PMH ASCENCION, who presents today for evaluation of left shoulder pain x 6 months. She reports pain localized to the left shoulder with decreased ROM. She denies radiating pain down the arm, neck pain, weakness. She denies numbness/tingling in fingers or toes. She went to PT for about a month with no improvement. She was also prescribed meloxicam but states this bothered her stomach and stopped taking it. She had a nerve conduction study performed with her PCP which showed evidence of peripheral neuropathy.

## 2024-06-03 ENCOUNTER — APPOINTMENT (OUTPATIENT)
Dept: UROGYNECOLOGY | Facility: CLINIC | Age: 64
End: 2024-06-03

## 2024-06-03 NOTE — ADDENDUM
[FreeTextEntry1] : This note was written by Shirley Burden, acting as the  for Dr. Cobian. This note accurately reflects the work and decisions made by Dr. Cobian.

## 2024-06-03 NOTE — HISTORY OF PRESENT ILLNESS
[FreeTextEntry1] : BLANK is a 64 year female who presents for f/u on urinary incontinence. UDS showed small bladder capacity but unable to reproduce any leakage. She was also unable to reproduce leakage during pad test. C/o UUI. Last seen in office on 02/01/2024, referred to [facility]. Patient is not interested in trying medication because she does not remember to take her pills.

## 2024-06-07 ENCOUNTER — APPOINTMENT (OUTPATIENT)
Dept: UROGYNECOLOGY | Facility: CLINIC | Age: 64
End: 2024-06-07
Payer: MEDICARE

## 2024-06-07 VITALS — DIASTOLIC BLOOD PRESSURE: 68 MMHG | SYSTOLIC BLOOD PRESSURE: 107 MMHG

## 2024-06-07 DIAGNOSIS — N39.46 MIXED INCONTINENCE: ICD-10-CM

## 2024-06-07 DIAGNOSIS — R39.15 URGENCY OF URINATION: ICD-10-CM

## 2024-06-07 PROCEDURE — 99213 OFFICE O/P EST LOW 20 MIN: CPT

## 2024-06-07 NOTE — REASON FOR VISIT
[[employer] ] : provided by [employer]   [Follow-Up Visit_____] : a follow-up visit for [unfilled] [Time Spent: ____ minutes] : Total time spent using  services: [unfilled] minutes. The patient's primary language is not English thus required  services. [Interpreters_IDNumber] : [id number] [Interpreters_FullName] : Bhavik [TWNoteComboBox1] : Filipino

## 2024-06-07 NOTE — DISCUSSION/SUMMARY
[FreeTextEntry1] : We reviewed behavioral modifications and timed voids in detail.  Discussed medications including ACH and beta 3 agonists.  Reviewed potential SE of ACH as well as the increased risk for dementia with long term use.  Discussed that often myrbetriq/gemtesa are expensive but that I would not know unless I sent rx.  After long discussion, she would like to try to start PT and perform behavioral modifications/timed voids.  She will f/u in 3-4 months and we will see how she is doing.  If no improvement, she will consider meds again or third line options.  Instructed to call with any questions or concerns and she verbalizes understanding.

## 2024-06-07 NOTE — HISTORY OF PRESENT ILLNESS
[FreeTextEntry1] : Pt presents to office for f/u on OAB.  UDS with low capacity.  At last visit 2/1/24 with Dr. Cobian, treatment options were discussed.  She elected to try PFPT as she did not want a daily medication.  Today she reports she has been unable to get an appointment with PT and wants to start medications.    daytime frequency 3x nocturia x 1 + UUI about 3x/day no pads +ANMOL water 24oz coffee- 16oz Denies any dysuria or UTI symptoms.

## 2024-06-27 ENCOUNTER — APPOINTMENT (OUTPATIENT)
Dept: NEUROLOGY | Facility: CLINIC | Age: 64
End: 2024-06-27
Payer: MEDICARE

## 2024-06-27 VITALS
HEART RATE: 73 BPM | BODY MASS INDEX: 29.19 KG/M2 | HEIGHT: 64 IN | OXYGEN SATURATION: 97 % | WEIGHT: 171 LBS | DIASTOLIC BLOOD PRESSURE: 67 MMHG | SYSTOLIC BLOOD PRESSURE: 108 MMHG

## 2024-06-27 DIAGNOSIS — M25.512 PAIN IN LEFT SHOULDER: ICD-10-CM

## 2024-06-27 DIAGNOSIS — G89.29 PAIN IN LEFT SHOULDER: ICD-10-CM

## 2024-06-27 PROCEDURE — 99213 OFFICE O/P EST LOW 20 MIN: CPT

## 2024-06-27 PROCEDURE — G2211 COMPLEX E/M VISIT ADD ON: CPT

## 2024-07-01 ENCOUNTER — APPOINTMENT (OUTPATIENT)
Dept: PULMONOLOGY | Facility: CLINIC | Age: 64
End: 2024-07-01
Payer: MEDICARE

## 2024-07-01 VITALS
WEIGHT: 168 LBS | SYSTOLIC BLOOD PRESSURE: 116 MMHG | OXYGEN SATURATION: 98 % | BODY MASS INDEX: 28.68 KG/M2 | RESPIRATION RATE: 16 BRPM | HEIGHT: 64 IN | DIASTOLIC BLOOD PRESSURE: 62 MMHG | HEART RATE: 70 BPM

## 2024-07-01 DIAGNOSIS — G47.33 OBSTRUCTIVE SLEEP APNEA (ADULT) (PEDIATRIC): ICD-10-CM

## 2024-07-01 DIAGNOSIS — R06.02 SHORTNESS OF BREATH: ICD-10-CM

## 2024-07-01 DIAGNOSIS — J43.9 EMPHYSEMA, UNSPECIFIED: ICD-10-CM

## 2024-07-01 PROCEDURE — 99205 OFFICE O/P NEW HI 60 MIN: CPT

## 2024-07-01 PROCEDURE — G2211 COMPLEX E/M VISIT ADD ON: CPT

## 2024-07-01 RX ORDER — IPRATROPIUM BROMIDE AND ALBUTEROL SULFATE 2.5; .5 MG/3ML; MG/3ML
0.5-2.5 (3) SOLUTION RESPIRATORY (INHALATION) 4 TIMES DAILY
Qty: 1080 | Refills: 3 | Status: ACTIVE | COMMUNITY
Start: 2024-07-01 | End: 1900-01-01

## 2024-07-01 RX ORDER — ALBUTEROL SULFATE 90 UG/1
108 (90 BASE) AEROSOL, METERED RESPIRATORY (INHALATION)
Qty: 1 | Refills: 6 | Status: ACTIVE | COMMUNITY
Start: 2024-07-01 | End: 1900-01-01

## 2024-07-03 NOTE — ED ADULT NURSE NOTE - CAS DISCH TRANSFER METHOD
07/02/24 0930   Team Meeting   Meeting Type Daily Rounds   Team Members Present   Team Members Present Physician;Nurse;   Physician Team Member Dr. Kaitlynn MD; BERNARD Vega   Nursing Team Member Junie Dempsey RN    Care Management Team Member Macrina Gillespie, MS, NCC, LPC   Patient/Family Present   Patient Present No   Patient's Family Present No   Mild anxiety and depression, medication compliant. Denies all. Will return home when stable and follow up with dr. Zarate.    Private car

## 2024-07-08 ENCOUNTER — NON-APPOINTMENT (OUTPATIENT)
Age: 64
End: 2024-07-08

## 2024-07-11 ENCOUNTER — APPOINTMENT (OUTPATIENT)
Dept: ORTHOPEDIC SURGERY | Facility: CLINIC | Age: 64
End: 2024-07-11
Payer: MEDICARE

## 2024-07-11 VITALS
HEIGHT: 64 IN | DIASTOLIC BLOOD PRESSURE: 64 MMHG | HEART RATE: 69 BPM | BODY MASS INDEX: 28.68 KG/M2 | SYSTOLIC BLOOD PRESSURE: 111 MMHG | WEIGHT: 168 LBS

## 2024-07-11 DIAGNOSIS — G89.29 PAIN IN LEFT SHOULDER: ICD-10-CM

## 2024-07-11 DIAGNOSIS — M25.512 PAIN IN LEFT SHOULDER: ICD-10-CM

## 2024-07-11 PROCEDURE — 99203 OFFICE O/P NEW LOW 30 MIN: CPT

## 2024-07-12 ENCOUNTER — APPOINTMENT (OUTPATIENT)
Dept: PHYSICAL MEDICINE AND REHAB | Facility: CLINIC | Age: 64
End: 2024-07-12
Payer: MEDICARE

## 2024-07-12 VITALS — BODY MASS INDEX: 29.77 KG/M2 | WEIGHT: 168 LBS | HEIGHT: 63 IN

## 2024-07-12 VITALS — DIASTOLIC BLOOD PRESSURE: 73 MMHG | SYSTOLIC BLOOD PRESSURE: 122 MMHG | HEART RATE: 65 BPM

## 2024-07-12 DIAGNOSIS — M79.18 MYALGIA, OTHER SITE: ICD-10-CM

## 2024-07-12 DIAGNOSIS — M54.50 LOW BACK PAIN, UNSPECIFIED: ICD-10-CM

## 2024-07-12 DIAGNOSIS — G89.29 LOW BACK PAIN, UNSPECIFIED: ICD-10-CM

## 2024-07-12 PROCEDURE — 99204 OFFICE O/P NEW MOD 45 MIN: CPT

## 2024-07-12 RX ORDER — CELECOXIB 100 MG/1
100 CAPSULE ORAL
Qty: 60 | Refills: 2 | Status: ACTIVE | COMMUNITY
Start: 2024-07-12 | End: 1900-01-01

## 2024-07-12 RX ORDER — METHOCARBAMOL 500 MG/1
500 TABLET, FILM COATED ORAL TWICE DAILY
Qty: 60 | Refills: 0 | Status: ACTIVE | COMMUNITY
Start: 2024-07-12 | End: 1900-01-01

## 2024-07-30 NOTE — H&P ADULT - PMH
How Severe Are Your Spot(S)?: mild
What Type Of Note Output Would You Prefer (Optional)?: Bullet Format
What Is The Reason For Today's Visit?: Full Body Skin Examination
What Is The Reason For Today's Visit? (Being Monitored For X): concerning skin lesions on a periodic basis
No pertinent past medical history

## 2024-08-21 ENCOUNTER — LABORATORY RESULT (OUTPATIENT)
Age: 64
End: 2024-08-21

## 2024-08-21 ENCOUNTER — APPOINTMENT (OUTPATIENT)
Dept: PHYSICAL MEDICINE AND REHAB | Facility: CLINIC | Age: 64
End: 2024-08-21

## 2024-08-21 NOTE — HISTORY OF PRESENT ILLNESS
[FreeTextEntry1] : Ms. BLANK AKHTAR is a 64 year female who presents with back pain Referred by Ortho (Dr. Munoz) who she sees for L shoulder pain. Previously saw Dr. Cutler in 2022 for back pain    Interval 08/21/2024  : Ms. BLANK AKHTAR is a 64 year female presents for follow up for back pain likely due to myofascial pain. At last visit, methocarbamol and celecoxib were started. PT referral was provided.      HPI  07/12/2024 : Arron [id number] Location: low back  Onset: on and off for a few years, exacerbated after lifting something heavy yesterday  Provocation/Palliative: worse with standing, twisting, movement.  Quality: constant, achy, tight, sharp  Radiation: nonradiating  Severity:  8/10   Denies any associated numbness. Denies any associated leg weakness. Denies any loss of bowel/bladder control or any groin numbness.   Current pain medications: icy hot patch  tylenol - not helpful ibuprofen - helpful    Previous medications trialed: medrol dose pack    Previous procedures relevant to complaint: Injections: none for spine or joints  Surgery: none for spine or joints    Conservative therapy tried: Physical Therapy: + for left shoulder, helpful     Diagnostic studies reviewed by me: XR L spine 2022 (orthopacs) - mild degenerative changes.

## 2024-08-21 NOTE — ASSESSMENT
[FreeTextEntry1] : Ms. BLANK AKHTAR is a 64 year female who presents with acute on chronic low back pain. no focal deficits. exam consistent with lumbar myofascial pain with component of lumbar spondylosis. At this time would maximize conservative measures.     Impression: back pain  myofascial pain lumbar spondylosis   Plan:  - ortho notes reviewed - XR L spine reviewed - Start Methocarbamol 500 mg tablet, 1 tablet PO BiD PRN pain, start at bedtime and increase dosing as tolerated. Rx sent. . Patient warned about the sedative nature of this medication and recommended not to drive or to handle heavy machinery. - start celecoxib 100mg BID PRN, take with food, rx sent.  - PT referral provided, emphasized on compliance to HEP.  - if pain does not improve, will consider further medication management vs TPI at next visit  - RTC in 6-8 weeks.     The patient expressed verbal understanding and is in agreement with the plan of care. All of the patient's questions and concerns were addressed during today's visit.

## 2024-08-21 NOTE — REVIEW OF SYSTEMS
[Fever] : no fever [Chills] : no chills [Chest Pain] : no chest pain [Shortness Of Breath] : no shortness of breath [Incontinence] : no incontinence [Difficulty Walking] : no difficulty walking [FreeTextEntry9] : +back pain

## 2024-08-26 ENCOUNTER — APPOINTMENT (OUTPATIENT)
Dept: PULMONOLOGY | Facility: CLINIC | Age: 64
End: 2024-08-26
Payer: MEDICARE

## 2024-08-26 VITALS
HEART RATE: 64 BPM | RESPIRATION RATE: 16 BRPM | OXYGEN SATURATION: 97 % | SYSTOLIC BLOOD PRESSURE: 108 MMHG | DIASTOLIC BLOOD PRESSURE: 72 MMHG

## 2024-08-26 VITALS — BODY MASS INDEX: 31.37 KG/M2 | HEIGHT: 60.75 IN | WEIGHT: 164 LBS

## 2024-08-26 DIAGNOSIS — J43.9 EMPHYSEMA, UNSPECIFIED: ICD-10-CM

## 2024-08-26 PROCEDURE — 94729 DIFFUSING CAPACITY: CPT

## 2024-08-26 PROCEDURE — 94727 GAS DIL/WSHOT DETER LNG VOL: CPT

## 2024-08-26 PROCEDURE — 94010 BREATHING CAPACITY TEST: CPT

## 2024-08-26 PROCEDURE — 99213 OFFICE O/P EST LOW 20 MIN: CPT | Mod: 25

## 2024-08-26 PROCEDURE — 85018 HEMOGLOBIN: CPT | Mod: QW

## 2024-08-26 RX ORDER — OMEGA-3/DHA/EPA/FISH OIL 300-1000MG
CAPSULE ORAL
Refills: 0 | Status: ACTIVE | COMMUNITY

## 2024-08-26 NOTE — HISTORY OF PRESENT ILLNESS
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  rohith Donahue         mild ASCENCION ( moderate in REM ) -  ct evidence of   emphysema  here for  SOB  -five cats  +   three dogs - now with sob - did not know results of  sleep study  reviewed today  - still tired    daytime  sleepiness    - PACHECO  at times not sure when   no  cough  - no chest pain  no wheezing - did have inhaler in past needs it   - feels like weather is major  trigger  - 8/26/2024 63y/o female  ex smoker  emphysema   with five cats  three dogs - reviewed allergies + dust - received nebulizer doing well - reviewed PFT  -  [TextBox_11] : 1/7 [TextBox_13] : 35 [YearQuit] : 1990's [TextBox_100] : 1/22 [TextBox_108] : 7.6 [TextBox_116] : 82 [TextBox_120] : REM 19.6 events/hour [ESS] : 9

## 2024-08-26 NOTE — PHYSICAL EXAM
[IV] : Mallampati Class: IV [Normal Appearance] : normal appearance [Supple] : supple [No Neck Mass] : no neck mass [No JVD] : no jvd [Normal Rate/Rhythm] : normal rate/rhythm [Normal S1, S2] : normal s1, s2 [No Murmurs] : no murmurs [No Resp Distress] : no resp distress [Clear to Auscultation Bilaterally] : clear to auscultation bilaterally [Benign] : benign [Not Tender] : not tender [No Masses] : no masses [Soft] : soft [No HSM] : no hsm [No Hernias] : no hernias [Normal Bowel Sounds] : normal bowel sounds [Normal Gait] : normal gait [No Clubbing] : no clubbing [No Edema] : no edema [No Rash] : no rash [No Motor Deficits] : no motor deficits [Normal Affect] : normal affect [TextBox_2] : pleasant  f no distress  speaking full sentences no cough  [TextBox_11] : no lesion

## 2024-08-26 NOTE — REVIEW OF SYSTEMS
[Fatigue] : fatigue [SOB on Exertion] : sob on exertion [Obesity] : obesity [Fever] : no fever [Recent Wt Gain (___ Lbs)] : ~T no recent weight gain [Chills] : no chills [Recent Wt Loss (___ Lbs)] : ~T no recent weight loss [Epistaxis] : no epistaxis [Sore Throat] : no sore throat [Postnasal Drip] : no postnasal drip [Dry Mouth] : no dry mouth [Sinus Problems] : no sinus problems [Mouth Ulcers] : no mouth ulcers [Cough] : no cough [Hemoptysis] : no hemoptysis [Sputum] : no sputum [Dyspnea] : no dyspnea [Wheezing] : no wheezing [Chest Discomfort] : no chest discomfort [Palpitations] : no palpitations [GERD] : no gerd [Abdominal Pain] : no abdominal pain [Nausea] : no nausea [Vomiting] : no vomiting [Dysphagia] : no dysphagia [Bleeding] : no bleeding [Chronic Pain] : no chronic pain [Rash] : no rash [Blood Transfusion] : no blood transfusion [Clotting Disorder/ Frequent bleeding] : no clotting disorder/ frequent bleeding [Thyroid Problem] : no thyroid problem [TextBox_144] : pre-DM

## 2024-08-26 NOTE — PROCEDURE
[FreeTextEntry1] : PFT 8/26/2024 Good efforts normal  FVC   normal  FEV1      normal  R   PFT 3/22/2023   normal  FVC  FEV1  R  normal   fef 25-75%  normal  TLC    normal  DLCO    normal  likely normal  PFT  TLC DLCO -  [facility]     8/272021    cors  mild emphysema  peripheral  areas of scarring

## 2024-08-26 NOTE — ASSESSMENT
[FreeTextEntry1] : 63y/o  female  1- ct emphysema  ex smoker < 20 pack years  with jose   reactive airways  copd/asthma overlap ? 2- cats  dogs pets 3- mild kevin/ moderate in REM  dx  1/2022     + daytime sleepiness  4- esophageal  abnormality in past 5- vaccination reviewed      Recommendations 1-  sleep study when  she can  has been busy with   2- albuterol MDI technique today reviewed  3- nebulizer 4- ipratropium - albuterol  5-  prevention discussed   RSV vaccine discussed   reviewed chart old studies  requested old records and agreement on plan

## 2024-08-29 ENCOUNTER — APPOINTMENT (OUTPATIENT)
Dept: ORTHOPEDIC SURGERY | Facility: CLINIC | Age: 64
End: 2024-08-29

## 2024-11-04 ENCOUNTER — OFFICE (OUTPATIENT)
Dept: URBAN - METROPOLITAN AREA CLINIC 12 | Facility: CLINIC | Age: 64
Setting detail: OPHTHALMOLOGY
End: 2024-11-04
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
## 2024-11-04 PROCEDURE — NO SHOW FE NO SHOW FEE: Performed by: OPHTHALMOLOGY

## 2024-11-08 ENCOUNTER — APPOINTMENT (OUTPATIENT)
Dept: UROGYNECOLOGY | Facility: CLINIC | Age: 64
End: 2024-11-08

## 2025-04-01 NOTE — ED STATDOCS - MEDICAL DECISION MAKING DETAILS
56 y/o F presents with painful rash to back, will refer to dermatologist and prescribe topical ointment. Patient stable for discharge. 58 y/o F presents with painful rash to back, will refer to dermatologist and prescribe oral antiviral. Patient stable for discharge.

## 2025-08-30 ENCOUNTER — NON-APPOINTMENT (OUTPATIENT)
Age: 65
End: 2025-08-30